# Patient Record
Sex: FEMALE | Race: WHITE | Employment: OTHER | URBAN - METROPOLITAN AREA
[De-identification: names, ages, dates, MRNs, and addresses within clinical notes are randomized per-mention and may not be internally consistent; named-entity substitution may affect disease eponyms.]

---

## 2021-05-18 ENCOUNTER — HOSPITAL ENCOUNTER (INPATIENT)
Age: 66
LOS: 4 days | Discharge: NURSING, REHAB OR LONG-TERM CARE | DRG: 522 | End: 2021-05-22
Attending: EMERGENCY MEDICINE | Admitting: INTERNAL MEDICINE
Payer: MEDICARE

## 2021-05-18 ENCOUNTER — APPOINTMENT (OUTPATIENT)
Dept: GENERAL RADIOLOGY | Age: 66
DRG: 522 | End: 2021-05-18
Attending: EMERGENCY MEDICINE
Payer: MEDICARE

## 2021-05-18 DIAGNOSIS — S72.002A CLOSED FRACTURE OF LEFT HIP, INITIAL ENCOUNTER (HCC): ICD-10-CM

## 2021-05-18 DIAGNOSIS — E87.6 HYPOKALEMIA: ICD-10-CM
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
## 2021-05-18 DIAGNOSIS — F41.9 ANXIETY: ICD-10-CM

## 2021-05-18 DIAGNOSIS — K44.9 HIATAL HERNIA: ICD-10-CM

## 2021-05-18 DIAGNOSIS — I10 HYPERTENSION, UNSPECIFIED TYPE: ICD-10-CM

## 2021-05-18 DIAGNOSIS — I35.0 SEVERE AORTIC STENOSIS: ICD-10-CM

## 2021-05-18 PROBLEM — S72.009A HIP FRACTURE (HCC): Status: ACTIVE | Noted: 2021-05-18

## 2021-05-18 LAB
ALBUMIN SERPL-MCNC: 4.2 G/DL (ref 3.5–5)
ALBUMIN/GLOB SERPL: 1.1 {RATIO} (ref 1.1–2.2)
ALP SERPL-CCNC: 98 U/L (ref 45–117)
ALT SERPL-CCNC: 25 U/L (ref 12–78)
ANION GAP SERPL CALC-SCNC: 6 MMOL/L (ref 5–15)
AST SERPL-CCNC: 18 U/L (ref 15–37)
BASOPHILS # BLD: 0 K/UL (ref 0–0.1)
BASOPHILS NFR BLD: 0 % (ref 0–1)
BILIRUB SERPL-MCNC: 0.4 MG/DL (ref 0.2–1)
BUN SERPL-MCNC: 14 MG/DL (ref 6–20)
BUN/CREAT SERPL: 18 (ref 12–20)
CALCIUM SERPL-MCNC: 8.4 MG/DL (ref 8.5–10.1)
CHLORIDE SERPL-SCNC: 105 MMOL/L (ref 97–108)
CO2 SERPL-SCNC: 27 MMOL/L (ref 21–32)
COMMENT, HOLDF: NORMAL
COVID-19 RAPID TEST, COVR: NOT DETECTED
CREAT SERPL-MCNC: 0.77 MG/DL (ref 0.55–1.02)
DIFFERENTIAL METHOD BLD: ABNORMAL
EOSINOPHIL # BLD: 0.1 K/UL (ref 0–0.4)
EOSINOPHIL NFR BLD: 1 % (ref 0–7)
ERYTHROCYTE [DISTWIDTH] IN BLOOD BY AUTOMATED COUNT: 12.9 % (ref 11.5–14.5)
GLOBULIN SER CALC-MCNC: 3.8 G/DL (ref 2–4)
GLUCOSE SERPL-MCNC: 115 MG/DL (ref 65–100)
HCT VFR BLD AUTO: 43.6 % (ref 35–47)
HGB BLD-MCNC: 14.3 G/DL (ref 11.5–16)
IMM GRANULOCYTES # BLD AUTO: 0.1 K/UL (ref 0–0.04)
IMM GRANULOCYTES NFR BLD AUTO: 1 % (ref 0–0.5)
INR PPP: 1 (ref 0.9–1.1)
LYMPHOCYTES # BLD: 0.8 K/UL (ref 0.8–3.5)
LYMPHOCYTES NFR BLD: 7 % (ref 12–49)
MAGNESIUM SERPL-MCNC: 2.1 MG/DL (ref 1.6–2.4)
MCH RBC QN AUTO: 30 PG (ref 26–34)
MCHC RBC AUTO-ENTMCNC: 32.8 G/DL (ref 30–36.5)
MCV RBC AUTO: 91.6 FL (ref 80–99)
MONOCYTES # BLD: 0.6 K/UL (ref 0–1)
MONOCYTES NFR BLD: 6 % (ref 5–13)
NEUTS SEG # BLD: 9.4 K/UL (ref 1.8–8)
NEUTS SEG NFR BLD: 85 % (ref 32–75)
NRBC # BLD: 0 K/UL (ref 0–0.01)
NRBC BLD-RTO: 0 PER 100 WBC
PLATELET # BLD AUTO: 216 K/UL (ref 150–400)
PMV BLD AUTO: 10.2 FL (ref 8.9–12.9)
POTASSIUM SERPL-SCNC: 3.2 MMOL/L (ref 3.5–5.1)
PROT SERPL-MCNC: 8 G/DL (ref 6.4–8.2)
PROTHROMBIN TIME: 10.4 SEC (ref 9–11.1)
RBC # BLD AUTO: 4.76 M/UL (ref 3.8–5.2)
SAMPLES BEING HELD,HOLD: NORMAL
SODIUM SERPL-SCNC: 138 MMOL/L (ref 136–145)
SOURCE, COVRS: NORMAL
TROPONIN I SERPL-MCNC: <0.05 NG/ML
WBC # BLD AUTO: 11 K/UL (ref 3.6–11)

## 2021-05-18 PROCEDURE — 85025 COMPLETE CBC W/AUTO DIFF WBC: CPT

## 2021-05-18 PROCEDURE — 84484 ASSAY OF TROPONIN QUANT: CPT

## 2021-05-18 PROCEDURE — 73560 X-RAY EXAM OF KNEE 1 OR 2: CPT

## 2021-05-18 PROCEDURE — 80053 COMPREHEN METABOLIC PANEL: CPT

## 2021-05-18 PROCEDURE — 36415 COLL VENOUS BLD VENIPUNCTURE: CPT

## 2021-05-18 PROCEDURE — 74011250636 HC RX REV CODE- 250/636: Performed by: INTERNAL MEDICINE

## 2021-05-18 PROCEDURE — 96375 TX/PRO/DX INJ NEW DRUG ADDON: CPT

## 2021-05-18 PROCEDURE — 72170 X-RAY EXAM OF PELVIS: CPT

## 2021-05-18 PROCEDURE — 99285 EMERGENCY DEPT VISIT HI MDM: CPT

## 2021-05-18 PROCEDURE — 71045 X-RAY EXAM CHEST 1 VIEW: CPT

## 2021-05-18 PROCEDURE — 93005 ELECTROCARDIOGRAM TRACING: CPT

## 2021-05-18 PROCEDURE — 73562 X-RAY EXAM OF KNEE 3: CPT

## 2021-05-18 PROCEDURE — 74011250636 HC RX REV CODE- 250/636: Performed by: EMERGENCY MEDICINE

## 2021-05-18 PROCEDURE — 74011250637 HC RX REV CODE- 250/637: Performed by: INTERNAL MEDICINE

## 2021-05-18 PROCEDURE — C9113 INJ PANTOPRAZOLE SODIUM, VIA: HCPCS | Performed by: INTERNAL MEDICINE

## 2021-05-18 PROCEDURE — 83735 ASSAY OF MAGNESIUM: CPT

## 2021-05-18 PROCEDURE — 73552 X-RAY EXAM OF FEMUR 2/>: CPT

## 2021-05-18 PROCEDURE — 74011250636 HC RX REV CODE- 250/636: Performed by: PHYSICIAN ASSISTANT

## 2021-05-18 PROCEDURE — 74011000250 HC RX REV CODE- 250: Performed by: EMERGENCY MEDICINE

## 2021-05-18 PROCEDURE — 87635 SARS-COV-2 COVID-19 AMP PRB: CPT

## 2021-05-18 PROCEDURE — 96374 THER/PROPH/DIAG INJ IV PUSH: CPT

## 2021-05-18 PROCEDURE — 74011250637 HC RX REV CODE- 250/637: Performed by: EMERGENCY MEDICINE

## 2021-05-18 PROCEDURE — 65660000000 HC RM CCU STEPDOWN

## 2021-05-18 PROCEDURE — 85610 PROTHROMBIN TIME: CPT

## 2021-05-18 RX ORDER — LABETALOL HCL 20 MG/4 ML
10 SYRINGE (ML) INTRAVENOUS
Status: DISCONTINUED | OUTPATIENT
Start: 2021-05-18 | End: 2021-05-18

## 2021-05-18 RX ORDER — AMLODIPINE BESYLATE 5 MG/1
5 TABLET ORAL DAILY
Status: DISCONTINUED | OUTPATIENT
Start: 2021-05-18 | End: 2021-05-22 | Stop reason: HOSPADM

## 2021-05-18 RX ORDER — SODIUM CHLORIDE 0.9 % (FLUSH) 0.9 %
5-40 SYRINGE (ML) INJECTION AS NEEDED
Status: DISCONTINUED | OUTPATIENT
Start: 2021-05-18 | End: 2021-05-19

## 2021-05-18 RX ORDER — CLOMIPRAMINE HYDROCHLORIDE 50 MG/1
50 CAPSULE ORAL
COMMUNITY

## 2021-05-18 RX ORDER — SERTRALINE HYDROCHLORIDE 100 MG/1
200 TABLET, FILM COATED ORAL
COMMUNITY

## 2021-05-18 RX ORDER — MORPHINE SULFATE 2 MG/ML
2 INJECTION, SOLUTION INTRAMUSCULAR; INTRAVENOUS
Status: DISCONTINUED | OUTPATIENT
Start: 2021-05-18 | End: 2021-05-19

## 2021-05-18 RX ORDER — ALPRAZOLAM 0.5 MG/1
0.5 TABLET ORAL
Status: ON HOLD | COMMUNITY
End: 2021-05-22 | Stop reason: SDUPTHER

## 2021-05-18 RX ORDER — METOPROLOL TARTRATE 5 MG/5ML
2.5 INJECTION INTRAVENOUS ONCE
Status: COMPLETED | OUTPATIENT
Start: 2021-05-18 | End: 2021-05-18

## 2021-05-18 RX ORDER — HYDROMORPHONE HYDROCHLORIDE 1 MG/ML
1 INJECTION, SOLUTION INTRAMUSCULAR; INTRAVENOUS; SUBCUTANEOUS ONCE
Status: COMPLETED | OUTPATIENT
Start: 2021-05-18 | End: 2021-05-18

## 2021-05-18 RX ORDER — HEPARIN SODIUM 5000 [USP'U]/ML
5000 INJECTION, SOLUTION INTRAVENOUS; SUBCUTANEOUS ONCE
Status: COMPLETED | OUTPATIENT
Start: 2021-05-18 | End: 2021-05-18

## 2021-05-18 RX ORDER — POLYETHYLENE GLYCOL 3350 17 G/17G
17 POWDER, FOR SOLUTION ORAL DAILY PRN
Status: DISCONTINUED | OUTPATIENT
Start: 2021-05-18 | End: 2021-05-22 | Stop reason: HOSPADM

## 2021-05-18 RX ORDER — SODIUM CHLORIDE 9 MG/ML
125 INJECTION, SOLUTION INTRAVENOUS ONCE
Status: COMPLETED | OUTPATIENT
Start: 2021-05-18 | End: 2021-05-19

## 2021-05-18 RX ORDER — SODIUM CHLORIDE 0.9 % (FLUSH) 0.9 %
5-40 SYRINGE (ML) INJECTION EVERY 8 HOURS
Status: DISCONTINUED | OUTPATIENT
Start: 2021-05-18 | End: 2021-05-19

## 2021-05-18 RX ORDER — POTASSIUM CHLORIDE 750 MG/1
40 TABLET, FILM COATED, EXTENDED RELEASE ORAL
Status: COMPLETED | OUTPATIENT
Start: 2021-05-18 | End: 2021-05-18

## 2021-05-18 RX ORDER — ACETAMINOPHEN 650 MG/1
650 SUPPOSITORY RECTAL
Status: DISCONTINUED | OUTPATIENT
Start: 2021-05-18 | End: 2021-05-19

## 2021-05-18 RX ORDER — ACETAMINOPHEN 325 MG/1
650 TABLET ORAL
Status: DISCONTINUED | OUTPATIENT
Start: 2021-05-18 | End: 2021-05-19

## 2021-05-18 RX ORDER — HYDRALAZINE HYDROCHLORIDE 20 MG/ML
20 INJECTION INTRAMUSCULAR; INTRAVENOUS
Status: DISCONTINUED | OUTPATIENT
Start: 2021-05-18 | End: 2021-05-22 | Stop reason: HOSPADM

## 2021-05-18 RX ORDER — LABETALOL 100 MG/1
100 TABLET, FILM COATED ORAL EVERY 12 HOURS
Status: DISCONTINUED | OUTPATIENT
Start: 2021-05-19 | End: 2021-05-20

## 2021-05-18 RX ORDER — PRAVASTATIN SODIUM 40 MG/1
40 TABLET ORAL DAILY
COMMUNITY

## 2021-05-18 RX ORDER — LEVOTHYROXINE SODIUM 200 UG/1
100 TABLET ORAL
COMMUNITY

## 2021-05-18 RX ORDER — NALOXONE HYDROCHLORIDE 0.4 MG/ML
0.4 INJECTION, SOLUTION INTRAMUSCULAR; INTRAVENOUS; SUBCUTANEOUS
Status: DISCONTINUED | OUTPATIENT
Start: 2021-05-18 | End: 2021-05-22 | Stop reason: HOSPADM

## 2021-05-18 RX ADMIN — MORPHINE SULFATE 2 MG: 2 INJECTION, SOLUTION INTRAMUSCULAR; INTRAVENOUS at 20:07

## 2021-05-18 RX ADMIN — Medication 10 ML: at 16:12

## 2021-05-18 RX ADMIN — LABETALOL HYDROCHLORIDE 10 MG: 5 INJECTION, SOLUTION INTRAVENOUS at 22:01

## 2021-05-18 RX ADMIN — Medication 10 ML: at 22:05

## 2021-05-18 RX ADMIN — PANTOPRAZOLE SODIUM 40 MG: 40 INJECTION, POWDER, FOR SOLUTION INTRAVENOUS at 20:07

## 2021-05-18 RX ADMIN — HYDROMORPHONE HYDROCHLORIDE 1 MG: 1 INJECTION, SOLUTION INTRAMUSCULAR; INTRAVENOUS; SUBCUTANEOUS at 14:09

## 2021-05-18 RX ADMIN — METOPROLOL TARTRATE 2.5 MG: 5 INJECTION INTRAVENOUS at 15:42

## 2021-05-18 RX ADMIN — AMLODIPINE BESYLATE 5 MG: 5 TABLET ORAL at 19:18

## 2021-05-18 RX ADMIN — LABETALOL HYDROCHLORIDE 100 MG: 100 TABLET, FILM COATED ORAL at 23:19

## 2021-05-18 RX ADMIN — HEPARIN SODIUM 5000 UNITS: 5000 INJECTION INTRAVENOUS; SUBCUTANEOUS at 18:09

## 2021-05-18 RX ADMIN — POTASSIUM CHLORIDE 40 MEQ: 750 TABLET, FILM COATED, EXTENDED RELEASE ORAL at 15:42

## 2021-05-18 RX ADMIN — MORPHINE SULFATE 2 MG: 2 INJECTION, SOLUTION INTRAMUSCULAR; INTRAVENOUS at 23:19

## 2021-05-18 RX ADMIN — ACETAMINOPHEN 650 MG: 325 TABLET ORAL at 22:00

## 2021-05-18 RX ADMIN — LABETALOL HYDROCHLORIDE 10 MG: 5 INJECTION, SOLUTION INTRAVENOUS at 18:10

## 2021-05-18 RX ADMIN — MORPHINE SULFATE 2 MG: 2 INJECTION, SOLUTION INTRAMUSCULAR; INTRAVENOUS at 16:41

## 2021-05-18 RX ADMIN — SODIUM CHLORIDE 125 ML/HR: 9 INJECTION, SOLUTION INTRAVENOUS at 14:24

## 2021-05-18 NOTE — PROGRESS NOTES
CARE MANAGEMENT INITIAL ASSESSEMENT      NAME:   Bala Marie   :     1955   MRN:     [id number]       Emergency Contact:  Extended Emergency Contact Information  Primary Emergency Contact: [address]  Mobile Phone: [phone]  Relation: Other Relative    Advance Directive:  No Order, Does not have advance directive. Healthcare Decision Maker:   Charles Saturnino- brother- Pt is unable to provide a phone number at this time. Pt does not believe the number on the chart is correct. Pt states that she does not have a cell phone and doesn't have his number written down anywhere. Pt states that he will be \"in and out\" so she can get his number then. Reason for Admission:  Ms. Tee Patel is a 78 y.o. female with history that includes HTN and hypokalemia. Pt was seen in the ER for fall- hip pain. There is no problem list on file for this patient. Assessment: In person with patient    RUR:  Not available  Risk Level:  Low  Value-based purchasing:  no  Bundle patient:  No    Residency: Private residence  Exterior Steps:  None  Interior Steps:  14- bedroom upstairs.  No usual problems ambulating up/down steps    Lives With: Alone    Prior functioning:  Independent  Pt requires assistance with: N/A    Prior DME required:  [x]None  []RW  []Cane  []Crutches  []Bedside commode  []CPAP  []Home O2 (Liters/Provider: )  []Nebulizer   []Shower Chair  []Wheelchair  []Hospital Bed  []Darlene  []Stair lift  []Rollator  []Other:    DME available:  [x]None  []RW  []Cane  []Crutches  []Bedside commode  []CPAP  []Home O2 (Liter/Provider: )  []Nebulizer  []Shower Chair  []Wheelchair  []Hospital Bed  []Darlene  []Stair lift  []Rollator  []Other:    Rehab history:  None     Discharge Concerns:  No    Insurer:  Payor: Ben Solum / Plan: [place] MEDICARE PART A & B / Product Type: Medicare /     PCP: Idalia Cervantes MD   Name of Practice: [facility]   Current patient: yes   Approximate date of last visit: 2019. Pt states that she had appt scheduled in 2020 but it was cancelled because of COVID and she never rescheduled it. Access to virtual PCP visits: no    Pharmacy:  [facility] Transport:  Family      Transition of care plan:   Unable to determine at this time. Awaiting clinical progress, and disposition recommendations. Comments:   CM completed initial assessment with Pt. Pt states that she lives at home alone. Pt has no hx of HH, home O2, or equipment. Pt is independent with ADLs and ambulation. Pt denies problems with either. CM will continue to monitor for discharge needs. _____________________________________  EVANGELIST Oconnell - Care Management  5/18/2021   3:49 PM    Care Management Interventions  PCP Verified by CM: Dony Hodge MD)  Mode of Transport at Discharge:  Other (see comment)(family)  Transition of Care Consult (CM Consult): Discharge Planning  MyChart Signup: No  Discharge Durable Medical Equipment: No  Physical Therapy Consult: No  Occupational Therapy Consult: No  Speech Therapy Consult: No  Current Support Network: Own Home, Lives Alone  Confirm Follow Up Transport: Family  Discharge Location  Discharge Placement: Home with outpatient services

## 2021-05-18 NOTE — ED NOTES
Patient is diaphoretic after ain medication. Patient denies chest pain, DR. Diamond at the bedside. Patient placed on 2L of O2 due to hypoxia after pain medication.

## 2021-05-18 NOTE — ED TRIAGE NOTES
Patient arrived via EMS. Reports fall , no LOC, no dizziness. Reports pain to the left hip. Left leg is rotated to the left, pulse and sensation present.

## 2021-05-18 NOTE — H&P
[address]  [address] [facility], [address]  [phone]    Hospitalist Admission Note      NAME:  Sole Leavitt   :      MRN:  [id number]     PCP:  Nyla Smith MD     Date of Service/Time:  2021 6:55 PM         Assessment / Plan:       78 y.o. female with hx of HTN, HLD, hiatal hernia admitted for L hip fracture after GLF      Left hip fracture: traumatic. Pain control on IV opioid analgesics PRN. Given a dose of heparin SQ in ED. Will need surgery per ortho; pre-op assessment ongoing as below. Pre-op assessment: uncontrolled HTN perhaps from inadequate pain control however her BP is markedly elevated. Takes no antihypertensives at home and states she has \"white coat syndrome\". AS on exam, and has CLEMENS. Check echo. Cardiology evaluation      HTN: markedly elevated BP. Control pain. Start Norvasc if pain remains uncontrolled despite pain control. IV labetalol PRN. Hernia, hiatal: Large hiatal hernia likely containing most of the stomach with associated left lower lobe atelectasis seen on CXR. Start PPI       Hypothyroidism: check TSH. Med rec to confirm use of Synthroid      HLD (hyperlipidemia): check FLP. Med rec to confirm use of statin      Code Status: FULL     Surrogate decision maker: family      ED notes, lab results, and imaging studies reviewed.        Total time spent with patient: 50 Minutes   Time spent in the care of this patient included reviewing records, discussing with nursing, obtaining history and examining the patient, and discussing treatment plans, with >50% time spent counseling/coordinating care    Risk of deterioration: High                 Care Plan discussed with: ED provider, Patient, Nursing Staff and >50% of time spent in counseling and coordination of care    Discussed:  Care Plan and D/C Planning    Prophylaxis:  Hep SQ    Disposition:  SNF/LTC                 Subjective:     CHIEF COMPLAINT: hip pain HISTORY OF PRESENT ILLNESS:     Ms. Jesus Palacios is a 78 y.o. female w/ hx of  HTN, HLD, hiatal hernia presenting with L hip pain. Started immediately after a GLF today. Thought that she tripped over her feet. Did not have any CP or SOB or palpitations prior to fall. Pain is severe, sharp, worse w/ movement, better after IV pain meds. She reports she walks one mile 3 times per week. However, she has a hard time keeping up with people with whom she walks, who are over [de-identified]years old. She finds herself short-of-breath, having to stop to catch her breath. Denies recent CP, dizziness, palpitations. States she has \"white coat syndrome\" causing her elevated BP. ED workup showed acute left femur neck fracture with varus angulation and foreshortening. Ms. Jesus Palacios is admitted for further evaluation and management. Past Medical History:   Diagnosis Date    Hernia, hiatal     Hypothyroidism     OCD (obsessive compulsive disorder)         History reviewed. No pertinent surgical history.     Social History     Tobacco Use    Smoking status: Never Smoker    Smokeless tobacco: Never Used   Substance Use Topics    Alcohol use: Not Currently        Family History: family history of HTN    No Known Allergies     Prior to Admission medications    Not on File       Review of Systems:  (bold if positive, if negative)    Gen:  Eyes:  ENT:  CVS:  Pulm:  dyspneaGI:  :  MS:  Pain, weakness, swelling, Skin:  Psych:  Endo:  Hem:  Renal:  Neuro:            Objective:      VITALS:    Vital signs reviewed; most recent are:    Visit Vitals  BP (!) 175/98   Pulse (!) 108   Temp 98.7 °F (37.1 °C)   Resp 16   Ht 5' 8\" (1.727 m)   Wt 72.6 kg (160 lb)   SpO2 95%   BMI 24.33 kg/m²     SpO2 Readings from Last 6 Encounters:   05/18/21 95%    O2 Flow Rate (L/min): 2 l/min   No intake or output data in the 24 hours ending 05/18/21 9484         Exam:     Physical Exam:    Gen:  Well-developed, well-nourished, in no acute distress  HEENT: No scleral icterus, hearing intact to voice, moist mucous membranes  Neck:  Supple, without masses. Resp:  No accessory muscle use. CTAB without wheezing, rales, rhonchi  Card: RRR. Normal S1 and S2 w/ 3/6 systolic murmur RUSB. No rubs or gallops. No peripheral lower extremity edema. No JVD. Peripheral pulses in tact. Abd:  Normoactive bowel sounds. Soft, non-tender, non-distended. No rebound, no guarding. No appreciable hepatosplenomegaly   Lymph:  No cervical adenopathy  Musc:  No cyanosis or clubbing  Skin:  No rashes or ulcers; turgor intact. Neuro:  Cranial nerves are grossly intact, no focal motor weakness, follows commands appropriately  Psych:  Good insight, normal affect. Alert, oriented x 3. Answers questions appropriately       Labs:    Recent Labs     05/18/21  1428   WBC 11.0   HGB 14.3   HCT 43.6        Recent Labs     05/18/21  1428      K 3.2*      CO2 27   *   BUN 14   CREA 0.77   CA 8.4*   MG 2.1   ALB 4.2   ALT 25     No components found for: GLPOC  No results for input(s): PH, PCO2, PO2, HCO3, FIO2 in the last 72 hours. Recent Labs     05/18/21  1428   INR 1.0     No results found for: SDES  No results found for: CULT  All other current labs reviewed in the computer. Imaging/Studies:    Xr Chest Sngl V    Result Date: 5/18/2021  Large hiatal hernia likely containing most of the stomach with associated left lower lobe atelectasis that could obscure other infiltrate. Xr Pelv Ap Only    Result Date: 5/18/2021  Acute left femur neck fracture with varus angulation and foreshortening. Bones are osteopenic which could obscure a nondisplaced fracture and if there is high clinical suspicion for the fracture site, CT can be considered. Xr Femur Lt 2 V    Result Date: 5/18/2021  Acute left femur neck fracture with varus angulation and foreshortening. Xr Knee Lt Max 2 Vws    Result Date: 5/18/2021  No acute findings. Advanced osteoarthritis. Osteopenia. Imaging personally reviewed.     EKG: sinus tachy, no S-ST changes  EKG personally reviewed    ___________________________________________________    Attending Physician: Aneta Dempsey MD

## 2021-05-18 NOTE — CONSULTS
ORTHO CONSULT NOTE    Date of Consultation:  May 18, 2021  Referring Physician:  Leeanne Escalante  CC: left hip pain    HPI:  Kika Hylton is a 78 y.o. female PMH thyroid disease, psoriasis, OCD, and previous hand surgeries Dr. Ruslan Flor  who c/o left hip pain s/p GLF. She states she simply tripped and fell onto her left side in her dining room at home today resulting in left hip pain and inability to ambulate. She denies open hip wound, foot numbness, and pain in other extremities. She denies chest pain, shortness of breath, and dizziness at the time of her fall. At baseline, she lives alone in a townhouse and ambulates without a device. She states she walks 1-1.5 miles for exercise several times weekly. She denies hip pain prior to her fall. Past Medical History:   Diagnosis Date    Hernia, hiatal     Hypothyroidism     OCD (obsessive compulsive disorder)          Social History     Tobacco Use    Smoking status: Never Smoker    Smokeless tobacco: Never Used   Substance Use Topics    Alcohol use: Not Currently     No Known Allergies     Review of Systems:  Per HPI. Objective:     Patient Vitals for the past 8 hrs:   BP Temp Pulse Resp SpO2 Height Weight   21 1545 (!) 176/102    91 %     21 1542 (!) 237/202  (!) 105       21 1445 (!) 198/114    94 %     21 1430 (!) 202/121    94 %     21 1427     94 %     21 1415 (!) 201/116    92 %     21 1400     93 %     21 1345 (!) 192/109    92 %     21 1325     98 %     21 1320 (!) 207/125 98.5 °F (36.9 °C) (!) 102 18 98 % 5' 8\" (1.727 m) 72.6 kg (160 lb)     Temp (24hrs), Av.5 °F (36.9 °C), Min:98.5 °F (36.9 °C), Max:98.5 °F (36.9 °C)      EXAM:   NAD. Answers questions appropriately. Cervical spine NTTP. Moves BUE spontaneously with NTTP long bones and joints. RLE no pain PROM, NTTP long bones and joints. Moves foot OK with SILT and palp DP.   LLE shortened and externally rotated. Hip skin intact and soft compartments. Knee, ankle and foot NTTP. Moves foot OK with SILT and palp DP. Bilat calf soft and NTTP. Imaging Review:   AP pelvis 5/18:  Acute left femur neck fracture with varus angulation and foreshortening. Bones are osteopenic which could obscure a nondisplaced fracture and if there is high clinical suspicion for the fracture site, CT can be considered. Labs:   WBC 11.0, Hgb 14.3, . BUN 14, Cr 0.77, eGFR > 60. Impression:     Patient Active Problem List    Diagnosis Date Noted    Hip fracture ([facility] [address] 05/18/2021    Hypothyroidism     Hernia, hiatal     HTN (hypertension)     HLD (hyperlipidemia)      Active Problems:    Hypothyroidism ()      Hernia, hiatal ()      HTN (hypertension) ()      HLD (hyperlipidemia) ()      Hip fracture ([facility] [address] (5/18/2021)      Displaced left femoral neck fracture. Plan:   I explained the nature of the injury and discussed the recommended surgery. I discussed potential risks/benefits/alternatives of surgery and the patient consents. Plan for left hip arthroplasty - total vs lorna. Medical evaluation/clearance pending. Bedrest.  NPO until timing of surgery determined. Ice. SCDs OK. Consider TXA IV and Heparin SQ single dose IN ER if surgery not within 8 hours. Dr. Talha Lopez is aware and agrees with above plan. Addendum 17:30:  After further activity discussion and imaging review, decision for left hip lorna-arthroplasty tomorrow afternoon ~15:30. I posted with OR. OK single dose Heparin now.   I'll hold off on TXA in ER with pending cardiac eval.      CARLIE Fontanez  Orthopedic Trauma Service  [facility] [place]

## 2021-05-18 NOTE — ED PROVIDER NOTES
Patient is a 65-year-old female with past medical history significant for, hyperlipidemia and hypothyroidism, as well as a fracture of her pelvis in the past related to fall and seen by [address] [place] [address]. She presents emergency department via EMS for evaluation of left hip pain that she sustained during a fall today. She states she is not sure what happened but denies any preceding symptoms and denies any chest pain or shortness of breath. Patient notes pain in her left hip. No prior surgeries to her hips or pelvis. She denies hitting her head and has no neck or back pain. Patient states she does take medications but did not bring them with her because she was told by the rescue squad that they had written them all down and she did need to bring in her list.  She denies taking anything for hypertension but states that she has history of whitecoat syndrome. She also denies having any history of lung disease or heart disease to her knowledge. She states of note however she has had some slight exercise intolerance and feels somewhat short of breath when she is walking with her friends. No past medical history on file. No past surgical history on file. No family history on file.     Social History     Socioeconomic History    Marital status: SINGLE     Spouse name: Not on file    Number of children: Not on file    Years of education: Not on file    Highest education level: Not on file   Occupational History    Not on file   Social Needs    Financial resource strain: Not on file    Food insecurity     Worry: Not on file     Inability: Not on file    Transportation needs     Medical: Not on file     Non-medical: Not on file   Tobacco Use    Smoking status: Not on file   Substance and Sexual Activity    Alcohol use: Not on file    Drug use: Not on file    Sexual activity: Not on file   Lifestyle    Physical activity     Days per week: Not on file     Minutes per session: Not on file    Stress: Not on file   Relationships    Social connections     Talks on phone: Not on file     Gets together: Not on file     Attends Latter day service: Not on file     Active member of club or organization: Not on file     Attends meetings of clubs or organizations: Not on file     Relationship status: Not on file    Intimate partner violence     Fear of current or ex partner: Not on file     Emotionally abused: Not on file     Physically abused: Not on file     Forced sexual activity: Not on file   Other Topics Concern    Not on file   Social History Narrative    Not on file         ALLERGIES: Patient has no known allergies. Review of Systems   Constitutional: Negative for chills and fever. HENT: Negative for drooling. Respiratory: Negative for shortness of breath. Cardiovascular: Negative for chest pain. Gastrointestinal: Negative for abdominal pain. Musculoskeletal: Positive for arthralgias. Negative for back pain, neck pain and neck stiffness. Skin: Negative for rash. Neurological: Negative for tremors, seizures, syncope, weakness, numbness and headaches. Hematological: Does not bruise/bleed easily. Psychiatric/Behavioral: Negative. Vitals:    05/18/21 1320 05/18/21 1325   BP: (!) 207/125    Pulse: (!) 102    Resp: 18    Temp: 98.5 °F (36.9 °C)    SpO2: 98% 98%   Weight: 72.6 kg (160 lb)    Height: 5' 8\" (1.727 m)             Physical Exam  Vitals signs and nursing note reviewed. Constitutional:       General: She is in acute distress. Appearance: She is not toxic-appearing or diaphoretic. HENT:      Head: Normocephalic and atraumatic. Right Ear: External ear normal.      Left Ear: External ear normal.   Eyes:      General: No scleral icterus. Neck:      Musculoskeletal: Neck supple. No muscular tenderness. Trachea: Trachea and phonation normal.   Cardiovascular:      Rate and Rhythm: Tachycardia present. Pulses: Normal pulses. Heart sounds:  No friction rub. Pulmonary:      Effort: Pulmonary effort is normal.      Breath sounds: Normal breath sounds. Abdominal:      Palpations: Abdomen is soft. Tenderness: There is no abdominal tenderness. There is no guarding. Musculoskeletal:      Comments: Left leg externally rotated and foreshortened. Skin:     General: Skin is warm and dry. Neurological:      Mental Status: She is alert and oriented to person, place, and time. Sensory: No sensory deficit. Psychiatric:         Mood and Affect: Mood normal.         Behavior: Behavior normal.         Thought Content: Thought content normal.         Judgment: Judgment normal.          MDM  Number of Diagnoses or Management Options     Amount and/or Complexity of Data Reviewed  Clinical lab tests: reviewed and ordered  Tests in the radiology section of CPT®: reviewed and ordered  Discuss the patient with other providers: yes  Independent visualization of images, tracings, or specimens: yes    Risk of Complications, Morbidity, and/or Mortality  Presenting problems: high  Diagnostic procedures: high  Management options: high    Patient Progress  Patient progress: improved    ED Course as of May 18 1513   Tue May 18, 2021   1432 EKG obtained at 1419 showing sinus tachycardia, rate 102, normal intervals, normal axis, no acute appearing findings, no prior EKG available for comparison.     [JE]      ED Course User Index  [JE] Isa Dowell MD       Critical Care  Performed by: Isa Dowell MD  Authorized by: Isa Dowell MD     Critical care provider statement:     Critical care time (minutes):  45    Critical care time was exclusive of:  Separately billable procedures and treating other patients    Critical care was necessary to treat or prevent imminent or life-threatening deterioration of the following conditions:  Trauma    Critical care was time spent personally by me on the following activities:  Ordering and performing treatments and interventions, ordering and review of laboratory studies, ordering and review of radiographic studies, re-evaluation of patient's condition, pulse oximetry, review of old charts, development of treatment plan with patient or surrogate, discussions with consultants, evaluation of patient's response to treatment, examination of patient and obtaining history from patient or surrogate    I assumed direction of critical care for this patient from another provider in my specialty: no            Perfect Serve Consult for Admission  3:47 PM    ED Room Number: ER10/10  Patient Name and age:  Aaron Shine 78 y.o.  female  Working Diagnosis:   1. Fall, initial encounter    2. Hypertension, unspecified type    3. Closed fracture of left hip, initial encounter ([facility] [address]    4. Hypokalemia    5. Hiatal hernia        COVID-19 Suspicion:  no  Sepsis present:  no  Reassessment needed: no  Code Status:  Full Code  Readmission: no  Isolation Requirements:  no  Recommended Level of Care:  telemetry  Department:[facility] ED - [phone]  Other: Patient is a 62-year-old female with past medical history significant for hypothyroidism, OCD and hyperlipidemia who presents emergency department after mechanical fall. She states she had no preceding symptoms but has a left angulated femoral neck fracture. She has been seen by Peter Mcelroy in the past and is being seen by them again today in ER. Plan is for OR. Patient denies cardiac history or history of hypertension however patient has been significantly hypertensive while in the ER. She states that she does have a history of whitecoat syndrome but obviously is in pain pain also. Despite significant improvement in her pain with Dilaudid she is still quite hypertensive and so a dose of Lopressor has been ordered. Patient also notes that she typically walks every day with friends but has not had some dyspnea on exertion lately. Denies any recent chest pains. Troponin is negative.

## 2021-05-18 NOTE — ED NOTES
TRANSFER - OUT REPORT:    Verbal report given to Rn PCC(name) on [facility]  being transferred to 3rd floor(unit) for routine progression of care       Report consisted of patients Situation, Background, Assessment and   Recommendations(SBAR). Information from the following report(s) SBAR, ED Summary, Intake/Output, MAR, Recent Results and Cardiac Rhythm Sinus tach was reviewed with the receiving nurse. Lines:   Peripheral IV 05/18/21 Left Antecubital (Active)   Site Assessment Clean, dry, & intact 05/18/21 1324   Phlebitis Assessment 0 05/18/21 1324   Infiltration Assessment 0 05/18/21 1324   Dressing Status Clean, dry, & intact 05/18/21 1324   Dressing Type Transparent 05/18/21 1324   Hub Color/Line Status Flushed 05/18/21 1324        Opportunity for questions and clarification was provided.       Patient transported with:   Monitor  O2 @ 2 liters  Registered Nurse

## 2021-05-18 NOTE — PROGRESS NOTES
TRANSFER - IN REPORT:    Verbal report received from [facility] on Supriya Agarwal  being received from ED for routine progression of care      Report consisted of patients Situation, Background, Assessment and   Recommendations(SBAR). Information from the following report(s) SBAR, Kardex and ED Summary was reviewed with the receiving nurse. Opportunity for questions and clarification was provided. Assessment completed upon patients arrival to unit and care assumed. 1909: /123, MD notified. Give norvasc now and recheck in 1 hr per MD .     Shift Change:      Bedside and Verbal shift change report given to Jai Reed (oncoming nurse) by Conner English (offgoing nurse). Report included the following information SBAR, Kardex and Recent Results.

## 2021-05-18 NOTE — CONSULTS
Full note to follow. 79 yo F w/ hx of ? HTN, hypothyroidism, HLD presenting with L hip fx s/p mechanical GLF. She has uncontrolled HTN perhaps from inadequate pain control however her BP is markedly elevated. Pt states she takes no antihypertensives at home and has \"white coat syndrome\". She further tells me while she walks a mile three times per week, she has a hard time keeping up with >[de-identified]year olds and gets winded, having to stop to catch her breath. On exam, she has a loud systolic murmur RUSB suspicious for AS. Preoperatively, pt will indeed need an echo. We will also ask cardiology to opine on preoperative assessment.      Discussed with ortho

## 2021-05-19 ENCOUNTER — ANESTHESIA (OUTPATIENT)
Dept: SURGERY | Age: 66
DRG: 522 | End: 2021-05-19
Payer: MEDICARE

## 2021-05-19 ENCOUNTER — APPOINTMENT (OUTPATIENT)
Dept: GENERAL RADIOLOGY | Age: 66
DRG: 522 | End: 2021-05-19
Attending: ANESTHESIOLOGY
Payer: MEDICARE

## 2021-05-19 ENCOUNTER — APPOINTMENT (OUTPATIENT)
Dept: NON INVASIVE DIAGNOSTICS | Age: 66
DRG: 522 | End: 2021-05-19
Attending: INTERNAL MEDICINE
Payer: MEDICARE

## 2021-05-19 ENCOUNTER — ANESTHESIA EVENT (OUTPATIENT)
Dept: SURGERY | Age: 66
DRG: 522 | End: 2021-05-19
Payer: MEDICARE

## 2021-05-19 ENCOUNTER — APPOINTMENT (OUTPATIENT)
Dept: GENERAL RADIOLOGY | Age: 66
DRG: 522 | End: 2021-05-19
Attending: PHYSICIAN ASSISTANT
Payer: MEDICARE

## 2021-05-19 LAB
ALBUMIN SERPL-MCNC: 3.6 G/DL (ref 3.5–5)
ALBUMIN/GLOB SERPL: 1 {RATIO} (ref 1.1–2.2)
ALP SERPL-CCNC: 80 U/L (ref 45–117)
ALT SERPL-CCNC: 22 U/L (ref 12–78)
ANION GAP SERPL CALC-SCNC: 5 MMOL/L (ref 5–15)
AST SERPL-CCNC: 23 U/L (ref 15–37)
ATRIAL RATE: 102 BPM
BASOPHILS # BLD: 0 K/UL (ref 0–0.1)
BASOPHILS NFR BLD: 0 % (ref 0–1)
BILIRUB SERPL-MCNC: 0.8 MG/DL (ref 0.2–1)
BUN SERPL-MCNC: 14 MG/DL (ref 6–20)
BUN/CREAT SERPL: 19 (ref 12–20)
CALCIUM SERPL-MCNC: 8.2 MG/DL (ref 8.5–10.1)
CALCULATED P AXIS, ECG09: 58 DEGREES
CALCULATED R AXIS, ECG10: 18 DEGREES
CALCULATED T AXIS, ECG11: 58 DEGREES
CHLORIDE SERPL-SCNC: 105 MMOL/L (ref 97–108)
CHOLEST SERPL-MCNC: 184 MG/DL
CO2 SERPL-SCNC: 28 MMOL/L (ref 21–32)
CREAT SERPL-MCNC: 0.72 MG/DL (ref 0.55–1.02)
DIAGNOSIS, 93000: NORMAL
DIFFERENTIAL METHOD BLD: ABNORMAL
ECHO AO ROOT DIAM: 3.73 CM
ECHO AV AREA PEAK VELOCITY: 0.74 CM2
ECHO AV AREA PEAK VELOCITY: 0.75 CM2
ECHO AV AREA VTI: 0.72 CM2
ECHO AV AREA/BSA VTI: 0.4 CM2/M2
ECHO AV MEAN GRADIENT: 40.69 MMHG
ECHO AV PEAK GRADIENT: 67.04 MMHG
ECHO AV PEAK VELOCITY: 409.39 CM/S
ECHO AV VTI: 81.63 CM
ECHO LA AREA 4C: 15.9 CM2
ECHO LA MAJOR AXIS: 2.91 CM
ECHO LA MINOR AXIS: 1.56 CM
ECHO LA VOL 2C: 43.91 ML (ref 22–52)
ECHO LA VOL 4C: 37.96 ML (ref 22–52)
ECHO LA VOL BP: 43.39 ML (ref 22–52)
ECHO LA VOL/BSA BIPLANE: 23.33 ML/M2 (ref 16–28)
ECHO LA VOLUME INDEX A2C: 23.61 ML/M2 (ref 16–28)
ECHO LA VOLUME INDEX A4C: 20.41 ML/M2 (ref 16–28)
ECHO LV E' LATERAL VELOCITY: 12.96 CENTIMETER/SECOND
ECHO LV E' SEPTAL VELOCITY: 9.24 CENTIMETER/SECOND
ECHO LV EDV A2C: 60.22 ML
ECHO LV EDV A4C: 81 ML
ECHO LV EDV BP: 72.76 ML (ref 56–104)
ECHO LV EDV INDEX A4C: 43.5 ML/M2
ECHO LV EDV INDEX BP: 39.1 ML/M2
ECHO LV EDV NDEX A2C: 32.4 ML/M2
ECHO LV EJECTION FRACTION A2C: 41 PERCENT
ECHO LV EJECTION FRACTION A4C: 54 PERCENT
ECHO LV EJECTION FRACTION BIPLANE: 50.2 PERCENT (ref 55–100)
ECHO LV ESV A2C: 35.35 ML
ECHO LV ESV A4C: 36.91 ML
ECHO LV ESV BP: 36.24 ML (ref 19–49)
ECHO LV ESV INDEX A2C: 19 ML/M2
ECHO LV ESV INDEX A4C: 19.8 ML/M2
ECHO LV ESV INDEX BP: 19.5 ML/M2
ECHO LV INTERNAL DIMENSION DIASTOLIC: 5.27 CM (ref 3.9–5.3)
ECHO LV INTERNAL DIMENSION SYSTOLIC: 3.74 CM
ECHO LV IVSD: 0.86 CM (ref 0.6–0.9)
ECHO LV MASS 2D: 169.1 G (ref 67–162)
ECHO LV MASS INDEX 2D: 90.9 G/M2 (ref 43–95)
ECHO LV POSTERIOR WALL DIASTOLIC: 0.91 CM (ref 0.6–0.9)
ECHO LVOT DIAM: 2.16 CM
ECHO LVOT PEAK GRADIENT: 2.74 MMHG
ECHO LVOT PEAK GRADIENT: 2.9 MMHG
ECHO LVOT PEAK VELOCITY: 82.73 CM/S
ECHO LVOT PEAK VELOCITY: 83.64 CM/S
ECHO LVOT SV: 59.1 ML
ECHO LVOT VTI: 16.1 CM
ECHO MV A VELOCITY: 39.93 CENTIMETER/SECOND
ECHO MV AREA PHT: 7.11 CM2
ECHO MV E DECELERATION TIME (DT): 106.66 MS
ECHO MV E VELOCITY: 107.52 CENTIMETER/SECOND
ECHO MV PRESSURE HALF TIME (PHT): 30.93 MS
ECHO RV INTERNAL DIMENSION: 2.05 CM
ECHO RV TAPSE: 2.23 CM (ref 1.5–2)
ECHO TV REGURGITANT MAX VELOCITY: 326.02 CM/S
ECHO TV REGURGITANT PEAK GRADIENT: 42.52 MMHG
EOSINOPHIL # BLD: 0 K/UL (ref 0–0.4)
EOSINOPHIL NFR BLD: 0 % (ref 0–7)
ERYTHROCYTE [DISTWIDTH] IN BLOOD BY AUTOMATED COUNT: 13.2 % (ref 11.5–14.5)
GLOBULIN SER CALC-MCNC: 3.7 G/DL (ref 2–4)
GLUCOSE BLD STRIP.AUTO-MCNC: 158 MG/DL (ref 65–117)
GLUCOSE SERPL-MCNC: 122 MG/DL (ref 65–100)
HCT VFR BLD AUTO: 40 % (ref 35–47)
HDLC SERPL-MCNC: 67 MG/DL
HDLC SERPL: 2.7 {RATIO} (ref 0–5)
HGB BLD-MCNC: 13.3 G/DL (ref 11.5–16)
IMM GRANULOCYTES # BLD AUTO: 0 K/UL (ref 0–0.04)
IMM GRANULOCYTES NFR BLD AUTO: 0 % (ref 0–0.5)
LA VOL DISK BP: 40.92 ML (ref 22–52)
LDLC SERPL CALC-MCNC: 103.8 MG/DL (ref 0–100)
LVOT MG: 1.34 MMHG
LYMPHOCYTES # BLD: 0.8 K/UL (ref 0.8–3.5)
LYMPHOCYTES NFR BLD: 6 % (ref 12–49)
MAGNESIUM SERPL-MCNC: 2.1 MG/DL (ref 1.6–2.4)
MCH RBC QN AUTO: 30.4 PG (ref 26–34)
MCHC RBC AUTO-ENTMCNC: 33.3 G/DL (ref 30–36.5)
MCV RBC AUTO: 91.3 FL (ref 80–99)
MONOCYTES # BLD: 1 K/UL (ref 0–1)
MONOCYTES NFR BLD: 8 % (ref 5–13)
NEUTS SEG # BLD: 10.9 K/UL (ref 1.8–8)
NEUTS SEG NFR BLD: 86 % (ref 32–75)
NRBC # BLD: 0 K/UL (ref 0–0.01)
NRBC BLD-RTO: 0 PER 100 WBC
P-R INTERVAL, ECG05: 158 MS
PHOSPHATE SERPL-MCNC: 2.7 MG/DL (ref 2.6–4.7)
PLATELET # BLD AUTO: 201 K/UL (ref 150–400)
PMV BLD AUTO: 10.6 FL (ref 8.9–12.9)
POTASSIUM SERPL-SCNC: 3.4 MMOL/L (ref 3.5–5.1)
PROT SERPL-MCNC: 7.3 G/DL (ref 6.4–8.2)
Q-T INTERVAL, ECG07: 396 MS
QRS DURATION, ECG06: 98 MS
QTC CALCULATION (BEZET), ECG08: 516 MS
RBC # BLD AUTO: 4.38 M/UL (ref 3.8–5.2)
RBC MORPH BLD: ABNORMAL
SERVICE CMNT-IMP: ABNORMAL
SODIUM SERPL-SCNC: 138 MMOL/L (ref 136–145)
TRIGL SERPL-MCNC: 66 MG/DL (ref ?–150)
TSH SERPL DL<=0.05 MIU/L-ACNC: 7.01 UIU/ML (ref 0.36–3.74)
VENTRICULAR RATE, ECG03: 102 BPM
VLDLC SERPL CALC-MCNC: 13.2 MG/DL
WBC # BLD AUTO: 12.7 K/UL (ref 3.6–11)

## 2021-05-19 PROCEDURE — 74011250636 HC RX REV CODE- 250/636: Performed by: INTERNAL MEDICINE

## 2021-05-19 PROCEDURE — 77030002933 HC SUT MCRYL J&J -A: Performed by: ORTHOPAEDIC SURGERY

## 2021-05-19 PROCEDURE — 82962 GLUCOSE BLOOD TEST: CPT

## 2021-05-19 PROCEDURE — 74011250637 HC RX REV CODE- 250/637: Performed by: INTERNAL MEDICINE

## 2021-05-19 PROCEDURE — 65660000000 HC RM CCU STEPDOWN

## 2021-05-19 PROCEDURE — 77030010507 HC ADH SKN DERMBND J&J -B: Performed by: ORTHOPAEDIC SURGERY

## 2021-05-19 PROCEDURE — 77030018074 HC RTVR SUT ARTH4 S&N -B: Performed by: ORTHOPAEDIC SURGERY

## 2021-05-19 PROCEDURE — 80061 LIPID PANEL: CPT

## 2021-05-19 PROCEDURE — C1776 JOINT DEVICE (IMPLANTABLE): HCPCS | Performed by: ORTHOPAEDIC SURGERY

## 2021-05-19 PROCEDURE — 72170 X-RAY EXAM OF PELVIS: CPT

## 2021-05-19 PROCEDURE — 77030008684 HC TU ET CUF COVD -B: Performed by: ANESTHESIOLOGY

## 2021-05-19 PROCEDURE — 74011250636 HC RX REV CODE- 250/636: Performed by: PHYSICIAN ASSISTANT

## 2021-05-19 PROCEDURE — 77030008462 HC STPLR SKN PROX J&J -A: Performed by: ORTHOPAEDIC SURGERY

## 2021-05-19 PROCEDURE — 77030040922 HC BLNKT HYPOTHRM STRY -A

## 2021-05-19 PROCEDURE — 76010000153 HC OR TIME 1.5 TO 2 HR: Performed by: ORTHOPAEDIC SURGERY

## 2021-05-19 PROCEDURE — 77030006835 HC BLD SAW SAG STRY -B: Performed by: ORTHOPAEDIC SURGERY

## 2021-05-19 PROCEDURE — 77030040241 HC ABD PLLW HIP MDII -B: Performed by: ORTHOPAEDIC SURGERY

## 2021-05-19 PROCEDURE — 74011250636 HC RX REV CODE- 250/636

## 2021-05-19 PROCEDURE — C9113 INJ PANTOPRAZOLE SODIUM, VIA: HCPCS | Performed by: PHYSICIAN ASSISTANT

## 2021-05-19 PROCEDURE — 77030013079 HC BLNKT BAIR HGGR 3M -A: Performed by: ANESTHESIOLOGY

## 2021-05-19 PROCEDURE — 74011250637 HC RX REV CODE- 250/637: Performed by: PHYSICIAN ASSISTANT

## 2021-05-19 PROCEDURE — 71045 X-RAY EXAM CHEST 1 VIEW: CPT

## 2021-05-19 PROCEDURE — 77030003029 HC SUT VCRL J&J -B: Performed by: ORTHOPAEDIC SURGERY

## 2021-05-19 PROCEDURE — 74011000250 HC RX REV CODE- 250: Performed by: NURSE ANESTHETIST, CERTIFIED REGISTERED

## 2021-05-19 PROCEDURE — 84100 ASSAY OF PHOSPHORUS: CPT

## 2021-05-19 PROCEDURE — 74011000250 HC RX REV CODE- 250: Performed by: PHYSICIAN ASSISTANT

## 2021-05-19 PROCEDURE — 93306 TTE W/DOPPLER COMPLETE: CPT

## 2021-05-19 PROCEDURE — 36415 COLL VENOUS BLD VENIPUNCTURE: CPT

## 2021-05-19 PROCEDURE — 80053 COMPREHEN METABOLIC PANEL: CPT

## 2021-05-19 PROCEDURE — 74011000258 HC RX REV CODE- 258: Performed by: NURSE ANESTHETIST, CERTIFIED REGISTERED

## 2021-05-19 PROCEDURE — 77030018673: Performed by: ORTHOPAEDIC SURGERY

## 2021-05-19 PROCEDURE — 74011250636 HC RX REV CODE- 250/636: Performed by: NURSE ANESTHETIST, CERTIFIED REGISTERED

## 2021-05-19 PROCEDURE — 77010033678 HC OXYGEN DAILY

## 2021-05-19 PROCEDURE — 74011000250 HC RX REV CODE- 250: Performed by: ANESTHESIOLOGY

## 2021-05-19 PROCEDURE — 76210000000 HC OR PH I REC 2 TO 2.5 HR: Performed by: ORTHOPAEDIC SURGERY

## 2021-05-19 PROCEDURE — 77030026438 HC STYL ET INTUB CARD -A: Performed by: ANESTHESIOLOGY

## 2021-05-19 PROCEDURE — 77030005513 HC CATH URETH FOL11 MDII -B: Performed by: ORTHOPAEDIC SURGERY

## 2021-05-19 PROCEDURE — 0SRS01Z REPLACEMENT OF LEFT HIP JOINT, FEMORAL SURFACE WITH METAL SYNTHETIC SUBSTITUTE, OPEN APPROACH: ICD-10-PCS | Performed by: ORTHOPAEDIC SURGERY

## 2021-05-19 PROCEDURE — 84443 ASSAY THYROID STIM HORMONE: CPT

## 2021-05-19 PROCEDURE — 77030040361 HC SLV COMPR DVT MDII -B

## 2021-05-19 PROCEDURE — 83735 ASSAY OF MAGNESIUM: CPT

## 2021-05-19 PROCEDURE — 85025 COMPLETE CBC W/AUTO DIFF WBC: CPT

## 2021-05-19 PROCEDURE — L1830 KO IMMOB CANVAS LONG PRE OTS: HCPCS | Performed by: ORTHOPAEDIC SURGERY

## 2021-05-19 PROCEDURE — 2709999900 HC NON-CHARGEABLE SUPPLY: Performed by: ORTHOPAEDIC SURGERY

## 2021-05-19 PROCEDURE — 76060000034 HC ANESTHESIA 1.5 TO 2 HR: Performed by: ORTHOPAEDIC SURGERY

## 2021-05-19 PROCEDURE — 99223 1ST HOSP IP/OBS HIGH 75: CPT | Performed by: INTERNAL MEDICINE

## 2021-05-19 PROCEDURE — 77030031139 HC SUT VCRL2 J&J -A: Performed by: ORTHOPAEDIC SURGERY

## 2021-05-19 PROCEDURE — 74011250637 HC RX REV CODE- 250/637: Performed by: NURSE ANESTHETIST, CERTIFIED REGISTERED

## 2021-05-19 PROCEDURE — 77030033067 HC SUT PDO STRATFX SPIR J&J -B: Performed by: ORTHOPAEDIC SURGERY

## 2021-05-19 DEVICE — IMPLANTABLE DEVICE: Type: IMPLANTABLE DEVICE | Site: HIP | Status: FUNCTIONAL

## 2021-05-19 DEVICE — STEM FEM L130MM DIA10MM 135DEG TI POR PLSM SPR HIP STD: Type: IMPLANTABLE DEVICE | Site: HIP | Status: FUNCTIONAL

## 2021-05-19 DEVICE — HIP H4 HEMI UNIV BIPLR IMPL CAPPED H4: Type: IMPLANTABLE DEVICE | Status: FUNCTIONAL

## 2021-05-19 DEVICE — HEAD FEM DIA28MM +3MM OFFSET FEM HIP CO CHROM TYP 1 PRI MOD: Type: IMPLANTABLE DEVICE | Site: HIP | Status: FUNCTIONAL

## 2021-05-19 RX ORDER — PRAVASTATIN SODIUM 20 MG/1
40 TABLET ORAL DAILY
Status: DISCONTINUED | OUTPATIENT
Start: 2021-05-19 | End: 2021-05-22 | Stop reason: HOSPADM

## 2021-05-19 RX ORDER — MIDAZOLAM HYDROCHLORIDE 1 MG/ML
INJECTION, SOLUTION INTRAMUSCULAR; INTRAVENOUS AS NEEDED
Status: DISCONTINUED | OUTPATIENT
Start: 2021-05-19 | End: 2021-05-19 | Stop reason: HOSPADM

## 2021-05-19 RX ORDER — NEOSTIGMINE METHYLSULFATE 1 MG/ML
INJECTION, SOLUTION INTRAVENOUS AS NEEDED
Status: DISCONTINUED | OUTPATIENT
Start: 2021-05-19 | End: 2021-05-19 | Stop reason: HOSPADM

## 2021-05-19 RX ORDER — SODIUM CHLORIDE, SODIUM LACTATE, POTASSIUM CHLORIDE, CALCIUM CHLORIDE 600; 310; 30; 20 MG/100ML; MG/100ML; MG/100ML; MG/100ML
INJECTION, SOLUTION INTRAVENOUS
Status: DISCONTINUED | OUTPATIENT
Start: 2021-05-19 | End: 2021-05-19 | Stop reason: HOSPADM

## 2021-05-19 RX ORDER — ENOXAPARIN SODIUM 100 MG/ML
40 INJECTION SUBCUTANEOUS DAILY
Status: DISCONTINUED | OUTPATIENT
Start: 2021-05-20 | End: 2021-05-22 | Stop reason: HOSPADM

## 2021-05-19 RX ORDER — SUCCINYLCHOLINE CHLORIDE 20 MG/ML
INJECTION INTRAMUSCULAR; INTRAVENOUS AS NEEDED
Status: DISCONTINUED | OUTPATIENT
Start: 2021-05-19 | End: 2021-05-19 | Stop reason: HOSPADM

## 2021-05-19 RX ORDER — ROCURONIUM BROMIDE 10 MG/ML
INJECTION, SOLUTION INTRAVENOUS AS NEEDED
Status: DISCONTINUED | OUTPATIENT
Start: 2021-05-19 | End: 2021-05-19 | Stop reason: HOSPADM

## 2021-05-19 RX ORDER — ONDANSETRON 2 MG/ML
4 INJECTION INTRAMUSCULAR; INTRAVENOUS
Status: ACTIVE | OUTPATIENT
Start: 2021-05-19 | End: 2021-05-20

## 2021-05-19 RX ORDER — GLYCOPYRROLATE 0.2 MG/ML
INJECTION INTRAMUSCULAR; INTRAVENOUS AS NEEDED
Status: DISCONTINUED | OUTPATIENT
Start: 2021-05-19 | End: 2021-05-19 | Stop reason: HOSPADM

## 2021-05-19 RX ORDER — OXYCODONE HYDROCHLORIDE 5 MG/1
2.5 TABLET ORAL
Status: DISCONTINUED | OUTPATIENT
Start: 2021-05-19 | End: 2021-05-22 | Stop reason: HOSPADM

## 2021-05-19 RX ORDER — DEXTROSE, SODIUM CHLORIDE, AND POTASSIUM CHLORIDE 5; .45; .15 G/100ML; G/100ML; G/100ML
75 INJECTION INTRAVENOUS CONTINUOUS
Status: DISCONTINUED | OUTPATIENT
Start: 2021-05-19 | End: 2021-05-19

## 2021-05-19 RX ORDER — HYDROMORPHONE HYDROCHLORIDE 1 MG/ML
INJECTION, SOLUTION INTRAMUSCULAR; INTRAVENOUS; SUBCUTANEOUS
Status: COMPLETED
Start: 2021-05-19 | End: 2021-05-19

## 2021-05-19 RX ORDER — FERROUS SULFATE, DRIED 160(50) MG
1 TABLET, EXTENDED RELEASE ORAL
Status: DISCONTINUED | OUTPATIENT
Start: 2021-05-20 | End: 2021-05-22 | Stop reason: HOSPADM

## 2021-05-19 RX ORDER — PROPOFOL 10 MG/ML
INJECTION, EMULSION INTRAVENOUS AS NEEDED
Status: DISCONTINUED | OUTPATIENT
Start: 2021-05-19 | End: 2021-05-19 | Stop reason: HOSPADM

## 2021-05-19 RX ORDER — HYDROMORPHONE HYDROCHLORIDE 1 MG/ML
1 INJECTION, SOLUTION INTRAMUSCULAR; INTRAVENOUS; SUBCUTANEOUS ONCE
Status: DISCONTINUED | OUTPATIENT
Start: 2021-05-19 | End: 2021-05-19 | Stop reason: HOSPADM

## 2021-05-19 RX ORDER — ACETAMINOPHEN 325 MG/1
650 TABLET ORAL EVERY 6 HOURS
Status: DISCONTINUED | OUTPATIENT
Start: 2021-05-20 | End: 2021-05-22 | Stop reason: HOSPADM

## 2021-05-19 RX ORDER — DEXAMETHASONE SODIUM PHOSPHATE 4 MG/ML
INJECTION, SOLUTION INTRA-ARTICULAR; INTRALESIONAL; INTRAMUSCULAR; INTRAVENOUS; SOFT TISSUE AS NEEDED
Status: DISCONTINUED | OUTPATIENT
Start: 2021-05-19 | End: 2021-05-19 | Stop reason: HOSPADM

## 2021-05-19 RX ORDER — HYDROMORPHONE HYDROCHLORIDE 2 MG/ML
INJECTION, SOLUTION INTRAMUSCULAR; INTRAVENOUS; SUBCUTANEOUS AS NEEDED
Status: DISCONTINUED | OUTPATIENT
Start: 2021-05-19 | End: 2021-05-19 | Stop reason: HOSPADM

## 2021-05-19 RX ORDER — ENOXAPARIN SODIUM 100 MG/ML
40 INJECTION SUBCUTANEOUS EVERY 24 HOURS
Status: DISCONTINUED | OUTPATIENT
Start: 2021-05-20 | End: 2021-05-19

## 2021-05-19 RX ORDER — ALPRAZOLAM 0.5 MG/1
0.5 TABLET ORAL
Status: DISCONTINUED | OUTPATIENT
Start: 2021-05-19 | End: 2021-05-22 | Stop reason: HOSPADM

## 2021-05-19 RX ORDER — LEVOTHYROXINE SODIUM 100 UG/1
100 TABLET ORAL
Status: DISCONTINUED | OUTPATIENT
Start: 2021-05-19 | End: 2021-05-22 | Stop reason: HOSPADM

## 2021-05-19 RX ORDER — METOPROLOL TARTRATE 5 MG/5ML
INJECTION INTRAVENOUS AS NEEDED
Status: DISCONTINUED | OUTPATIENT
Start: 2021-05-19 | End: 2021-05-19 | Stop reason: HOSPADM

## 2021-05-19 RX ORDER — FENTANYL CITRATE 50 UG/ML
INJECTION, SOLUTION INTRAMUSCULAR; INTRAVENOUS AS NEEDED
Status: DISCONTINUED | OUTPATIENT
Start: 2021-05-19 | End: 2021-05-19 | Stop reason: HOSPADM

## 2021-05-19 RX ORDER — ALBUTEROL SULFATE 90 UG/1
AEROSOL, METERED RESPIRATORY (INHALATION) AS NEEDED
Status: DISCONTINUED | OUTPATIENT
Start: 2021-05-19 | End: 2021-05-19 | Stop reason: HOSPADM

## 2021-05-19 RX ORDER — NALOXONE HYDROCHLORIDE 0.4 MG/ML
0.4 INJECTION, SOLUTION INTRAMUSCULAR; INTRAVENOUS; SUBCUTANEOUS AS NEEDED
Status: DISCONTINUED | OUTPATIENT
Start: 2021-05-19 | End: 2021-05-22 | Stop reason: HOSPADM

## 2021-05-19 RX ORDER — SODIUM CHLORIDE 0.9 % (FLUSH) 0.9 %
5-40 SYRINGE (ML) INJECTION AS NEEDED
Status: DISCONTINUED | OUTPATIENT
Start: 2021-05-19 | End: 2021-05-22 | Stop reason: HOSPADM

## 2021-05-19 RX ORDER — PHENYLEPHRINE HCL IN 0.9% NACL 0.4MG/10ML
SYRINGE (ML) INTRAVENOUS AS NEEDED
Status: DISCONTINUED | OUTPATIENT
Start: 2021-05-19 | End: 2021-05-19 | Stop reason: HOSPADM

## 2021-05-19 RX ORDER — LIDOCAINE HYDROCHLORIDE 20 MG/ML
INJECTION, SOLUTION EPIDURAL; INFILTRATION; INTRACAUDAL; PERINEURAL AS NEEDED
Status: DISCONTINUED | OUTPATIENT
Start: 2021-05-19 | End: 2021-05-19 | Stop reason: HOSPADM

## 2021-05-19 RX ORDER — ALBUTEROL SULFATE 0.83 MG/ML
SOLUTION RESPIRATORY (INHALATION)
Status: DISCONTINUED
Start: 2021-05-19 | End: 2021-05-19 | Stop reason: WASHOUT

## 2021-05-19 RX ORDER — SODIUM CHLORIDE 9 MG/ML
50 INJECTION, SOLUTION INTRAVENOUS CONTINUOUS
Status: DISCONTINUED | OUTPATIENT
Start: 2021-05-19 | End: 2021-05-20

## 2021-05-19 RX ORDER — ONDANSETRON 2 MG/ML
INJECTION INTRAMUSCULAR; INTRAVENOUS AS NEEDED
Status: DISCONTINUED | OUTPATIENT
Start: 2021-05-19 | End: 2021-05-19 | Stop reason: HOSPADM

## 2021-05-19 RX ORDER — FAMOTIDINE 10 MG/ML
INJECTION INTRAVENOUS AS NEEDED
Status: DISCONTINUED | OUTPATIENT
Start: 2021-05-19 | End: 2021-05-19 | Stop reason: HOSPADM

## 2021-05-19 RX ORDER — HYDROMORPHONE HYDROCHLORIDE 1 MG/ML
1 INJECTION, SOLUTION INTRAMUSCULAR; INTRAVENOUS; SUBCUTANEOUS
Status: DISCONTINUED | OUTPATIENT
Start: 2021-05-19 | End: 2021-05-19

## 2021-05-19 RX ORDER — SODIUM CHLORIDE 0.9 % (FLUSH) 0.9 %
5-40 SYRINGE (ML) INJECTION EVERY 8 HOURS
Status: DISCONTINUED | OUTPATIENT
Start: 2021-05-19 | End: 2021-05-22 | Stop reason: HOSPADM

## 2021-05-19 RX ORDER — OXYCODONE HYDROCHLORIDE 5 MG/1
5 TABLET ORAL
Status: DISCONTINUED | OUTPATIENT
Start: 2021-05-19 | End: 2021-05-22 | Stop reason: HOSPADM

## 2021-05-19 RX ORDER — AMOXICILLIN 250 MG
1 CAPSULE ORAL 2 TIMES DAILY
Status: DISCONTINUED | OUTPATIENT
Start: 2021-05-19 | End: 2021-05-22 | Stop reason: HOSPADM

## 2021-05-19 RX ORDER — HYDROMORPHONE HYDROCHLORIDE 1 MG/ML
0.5 INJECTION, SOLUTION INTRAMUSCULAR; INTRAVENOUS; SUBCUTANEOUS
Status: ACTIVE | OUTPATIENT
Start: 2021-05-19 | End: 2021-05-20

## 2021-05-19 RX ORDER — POLYETHYLENE GLYCOL 3350 17 G/17G
17 POWDER, FOR SOLUTION ORAL DAILY
Status: DISCONTINUED | OUTPATIENT
Start: 2021-05-20 | End: 2021-05-22 | Stop reason: HOSPADM

## 2021-05-19 RX ORDER — SERTRALINE HYDROCHLORIDE 50 MG/1
200 TABLET, FILM COATED ORAL
Status: DISCONTINUED | OUTPATIENT
Start: 2021-05-19 | End: 2021-05-22 | Stop reason: HOSPADM

## 2021-05-19 RX ORDER — CEFAZOLIN SODIUM 1 G/3ML
INJECTION, POWDER, FOR SOLUTION INTRAMUSCULAR; INTRAVENOUS AS NEEDED
Status: DISCONTINUED | OUTPATIENT
Start: 2021-05-19 | End: 2021-05-19 | Stop reason: HOSPADM

## 2021-05-19 RX ORDER — IPRATROPIUM BROMIDE AND ALBUTEROL SULFATE 2.5; .5 MG/3ML; MG/3ML
3 SOLUTION RESPIRATORY (INHALATION)
Status: COMPLETED | OUTPATIENT
Start: 2021-05-19 | End: 2021-05-19

## 2021-05-19 RX ORDER — FACIAL-BODY WIPES
10 EACH TOPICAL DAILY PRN
Status: DISCONTINUED | OUTPATIENT
Start: 2021-05-21 | End: 2021-05-22 | Stop reason: HOSPADM

## 2021-05-19 RX ADMIN — DEXAMETHASONE SODIUM PHOSPHATE 4 MG: 4 INJECTION, SOLUTION INTRAMUSCULAR; INTRAVENOUS at 16:09

## 2021-05-19 RX ADMIN — HYDROMORPHONE HYDROCHLORIDE 0.4 MG: 2 INJECTION INTRAMUSCULAR; INTRAVENOUS; SUBCUTANEOUS at 17:19

## 2021-05-19 RX ADMIN — Medication 3 MG: at 17:16

## 2021-05-19 RX ADMIN — Medication 10 ML: at 07:05

## 2021-05-19 RX ADMIN — PRAVASTATIN SODIUM 40 MG: 20 TABLET ORAL at 08:45

## 2021-05-19 RX ADMIN — HYDRALAZINE HYDROCHLORIDE 20 MG: 20 INJECTION INTRAMUSCULAR; INTRAVENOUS at 12:52

## 2021-05-19 RX ADMIN — DOCUSATE SODIUM 50MG AND SENNOSIDES 8.6MG 1 TABLET: 8.6; 5 TABLET, FILM COATED ORAL at 21:13

## 2021-05-19 RX ADMIN — GLYCOPYRROLATE 0.2 MG: 0.2 INJECTION INTRAMUSCULAR; INTRAVENOUS at 17:16

## 2021-05-19 RX ADMIN — PROPOFOL 100 MG: 10 INJECTION, EMULSION INTRAVENOUS at 16:03

## 2021-05-19 RX ADMIN — ONDANSETRON HYDROCHLORIDE 4 MG: 2 SOLUTION INTRAMUSCULAR; INTRAVENOUS at 17:16

## 2021-05-19 RX ADMIN — ALBUTEROL SULFATE 8 PUFF: 90 AEROSOL, METERED RESPIRATORY (INHALATION) at 17:49

## 2021-05-19 RX ADMIN — LEVOTHYROXINE SODIUM 100 MCG: 0.1 TABLET ORAL at 08:44

## 2021-05-19 RX ADMIN — SODIUM CHLORIDE, POTASSIUM CHLORIDE, SODIUM LACTATE AND CALCIUM CHLORIDE: 600; 310; 30; 20 INJECTION, SOLUTION INTRAVENOUS at 17:03

## 2021-05-19 RX ADMIN — FENTANYL CITRATE 100 MCG: 50 INJECTION, SOLUTION INTRAMUSCULAR; INTRAVENOUS at 16:03

## 2021-05-19 RX ADMIN — SUCCINYLCHOLINE CHLORIDE 100 MG: 20 INJECTION, SOLUTION INTRAMUSCULAR; INTRAVENOUS at 16:03

## 2021-05-19 RX ADMIN — HYDROMORPHONE HYDROCHLORIDE 1 MG: 1 INJECTION, SOLUTION INTRAMUSCULAR; INTRAVENOUS; SUBCUTANEOUS at 06:30

## 2021-05-19 RX ADMIN — MORPHINE SULFATE 2 MG: 2 INJECTION, SOLUTION INTRAMUSCULAR; INTRAVENOUS at 02:41

## 2021-05-19 RX ADMIN — PHENYLEPHRINE HYDROCHLORIDE 50 MCG/MIN: 10 INJECTION INTRAVENOUS at 16:14

## 2021-05-19 RX ADMIN — ROCURONIUM BROMIDE 15 MG: 10 INJECTION INTRAVENOUS at 16:09

## 2021-05-19 RX ADMIN — AMLODIPINE BESYLATE 5 MG: 5 TABLET ORAL at 21:08

## 2021-05-19 RX ADMIN — HYDROMORPHONE HYDROCHLORIDE 0.4 MG: 2 INJECTION INTRAMUSCULAR; INTRAVENOUS; SUBCUTANEOUS at 17:30

## 2021-05-19 RX ADMIN — ALPRAZOLAM 0.5 MG: 0.5 TABLET ORAL at 12:48

## 2021-05-19 RX ADMIN — SERTRALINE HYDROCHLORIDE 200 MG: 50 TABLET, FILM COATED ORAL at 21:13

## 2021-05-19 RX ADMIN — ROCURONIUM BROMIDE 10 MG: 10 INJECTION INTRAVENOUS at 17:03

## 2021-05-19 RX ADMIN — OXYCODONE 5 MG: 5 TABLET ORAL at 22:07

## 2021-05-19 RX ADMIN — MIDAZOLAM HYDROCHLORIDE 1 MG: 2 INJECTION, SOLUTION INTRAMUSCULAR; INTRAVENOUS at 15:58

## 2021-05-19 RX ADMIN — POTASSIUM CHLORIDE, DEXTROSE MONOHYDRATE AND SODIUM CHLORIDE 75 ML/HR: 150; 5; 450 INJECTION, SOLUTION INTRAVENOUS at 08:49

## 2021-05-19 RX ADMIN — Medication 100 MCG: at 16:03

## 2021-05-19 RX ADMIN — MIDAZOLAM HYDROCHLORIDE 1 MG: 2 INJECTION, SOLUTION INTRAMUSCULAR; INTRAVENOUS at 15:56

## 2021-05-19 RX ADMIN — ROCURONIUM BROMIDE 5 MG: 10 INJECTION INTRAVENOUS at 16:03

## 2021-05-19 RX ADMIN — SODIUM CHLORIDE 125 ML/HR: 9 INJECTION, SOLUTION INTRAVENOUS at 21:13

## 2021-05-19 RX ADMIN — LABETALOL HYDROCHLORIDE 100 MG: 100 TABLET, FILM COATED ORAL at 22:07

## 2021-05-19 RX ADMIN — SODIUM CHLORIDE, POTASSIUM CHLORIDE, SODIUM LACTATE AND CALCIUM CHLORIDE: 600; 310; 30; 20 INJECTION, SOLUTION INTRAVENOUS at 15:56

## 2021-05-19 RX ADMIN — PANTOPRAZOLE SODIUM 40 MG: 40 INJECTION, POWDER, FOR SOLUTION INTRAVENOUS at 21:08

## 2021-05-19 RX ADMIN — FAMOTIDINE 20 MG: 10 INJECTION INTRAVENOUS at 17:03

## 2021-05-19 RX ADMIN — MORPHINE SULFATE 2 MG: 2 INJECTION, SOLUTION INTRAMUSCULAR; INTRAVENOUS at 09:57

## 2021-05-19 RX ADMIN — FENTANYL CITRATE 50 MCG: 50 INJECTION, SOLUTION INTRAMUSCULAR; INTRAVENOUS at 15:31

## 2021-05-19 RX ADMIN — HYDROMORPHONE HYDROCHLORIDE 0.4 MG: 2 INJECTION INTRAMUSCULAR; INTRAVENOUS; SUBCUTANEOUS at 16:46

## 2021-05-19 RX ADMIN — CEFAZOLIN SODIUM 2 G: 1 POWDER, FOR SOLUTION INTRAMUSCULAR; INTRAVENOUS at 16:15

## 2021-05-19 RX ADMIN — ROCURONIUM BROMIDE 10 MG: 10 INJECTION INTRAVENOUS at 16:46

## 2021-05-19 RX ADMIN — Medication 100 MCG: at 16:18

## 2021-05-19 RX ADMIN — SUGAMMADEX 200 MG: 100 INJECTION, SOLUTION INTRAVENOUS at 18:35

## 2021-05-19 RX ADMIN — FENTANYL CITRATE 50 MCG: 50 INJECTION, SOLUTION INTRAMUSCULAR; INTRAVENOUS at 16:35

## 2021-05-19 RX ADMIN — METOPROLOL TARTRATE 2 MG: 5 INJECTION, SOLUTION INTRAVENOUS at 17:37

## 2021-05-19 RX ADMIN — METOPROLOL TARTRATE 2 MG: 5 INJECTION, SOLUTION INTRAVENOUS at 17:06

## 2021-05-19 RX ADMIN — FENTANYL CITRATE 50 MCG: 50 INJECTION, SOLUTION INTRAMUSCULAR; INTRAVENOUS at 16:29

## 2021-05-19 RX ADMIN — Medication 10 ML: at 23:01

## 2021-05-19 RX ADMIN — LIDOCAINE HYDROCHLORIDE 100 MG: 20 INJECTION, SOLUTION EPIDURAL; INFILTRATION; INTRACAUDAL; PERINEURAL at 16:03

## 2021-05-19 RX ADMIN — IPRATROPIUM BROMIDE AND ALBUTEROL SULFATE 3 ML: .5; 2.5 SOLUTION RESPIRATORY (INHALATION) at 18:20

## 2021-05-19 RX ADMIN — CEFAZOLIN 2 G: 1 INJECTION, POWDER, FOR SOLUTION INTRAMUSCULAR; INTRAVENOUS at 21:13

## 2021-05-19 RX ADMIN — LABETALOL HYDROCHLORIDE 100 MG: 100 TABLET, FILM COATED ORAL at 08:39

## 2021-05-19 RX ADMIN — METOPROLOL TARTRATE 1 MG: 5 INJECTION, SOLUTION INTRAVENOUS at 17:03

## 2021-05-19 NOTE — PROGRESS NOTES
5/19/2021  10:53 AM    RUR:  11%  Risk Level: [x]Low []Moderate []High  Value-based purchasing: [] Yes [x] No  Bundle patient: [x] Yes [] No   Specify: [facility] Major Joint Lower Extremity Replacement    Transition of care plan:  1. Cardiology eval; plan for ECHO; plan for left hip surgery if cleared by cardiology   2. Pt lives alone at home, needs TBD pending clinical progression  3. CM verified brother, Sonali Bassett, phone number: [phone]; NOK and primary decision maker  4. Outpatient follow-up. 5. Pt's family to transport  6.  CM to follow for needs    Eid Najera RN

## 2021-05-19 NOTE — BRIEF OP NOTE
Brief Postoperative Note    Patient: Tahir Gentile  YOB: 1955  MRN: [id number]    Date of Procedure: 5/19/2021     Pre-Op Diagnosis: left hip fracture    Post-Op Diagnosis: Same as preoperative diagnosis. Procedure(s):  LEFT HIP HEMIARTHROPLASTY    Surgeon(s):  Bettie Shirley MD    Surgical Assistant: Physician Assistant: Marixa Ramsey PA-C    Anesthesia: General     Estimated Blood Loss (mL): minimal     Complications: None    Specimens: * No specimens in log *     Implants:   Implant Name Type Inv. Item Serial No.  Lot No. LRB No. Used Action   STEM FEM L130MM [id number] 135DEG TI POR PLSM SPR HIP STD - SNA  STEM FEM L130MM [id number] 135DEG TI POR PLSM SPR HIP STD NA [employer] [id number] Left 1 Implanted   HEAD FEM MOD TAPR 28MM +3MM NK --  - SNA  HEAD FEM MOD TAPR 28MM +3MM NK --  NA [employer]WD [id number] Left 1 Implanted   CUP ACET BPLR [id number] TI --  - SNA  CUP ACET BPLR [id number] TI --  NA [employer]WD [id number] Left 1 Implanted       Drains: * No LDAs found *    Findings: As above.      Electronically Signed by Sabas Elizabeth PA-C on 5/19/2021 at 6:07 PM stated

## 2021-05-19 NOTE — CONSULTS
[address]     Name:  Bella Mcgregor  MR#:  [id number]  :  1955  ACCOUNT #:  [de-identified]  DATE OF SERVICE:  2021      CHIEF COMPLAINT:  Left hip pain. HISTORY OF PRESENT ILLNESS:  The patient is a pleasant 30-year-old female who was running her cat out yesterday evening and slipped and fell onto her backside, noting immediate pain. She was unable to ambulate and was brought to the Emergency Department and found to have hip fracture. She has been admitted to the Hospitalist Service, seen by Cardiology. An echo has been performed and she has been cleared for surgery. Complaining of pain in the left groin. Denies any numbness or tingling. Denies any other injury. PAST MEDICAL HISTORY:  OCD, hypothyroidism, hiatal hernia. MEDICATIONS:  Per admission medication reconciliation. ALLERGIES:  Per admission medication reconciliation. SOCIAL HISTORY:  She does not smoke. She does not drink. She lives alone. Does not use any assistive devices for ambulation. FAMILY HISTORY:  Notable for hypertension. REVIEW OF SYSTEMS:  She did have some dyspnea on exertion, but denies any currently. Complaining of pain in the left lower extremity. Otherwise negative for 10-system review. PHYSICAL EXAMINATION:  VITAL SIGNS:  Temperature 97.9, pulse is 117, blood pressure 155/101, respirations 20, oxygenation 93% on 2 L nasal cannula. GENERAL:  She is alert and oriented, in no apparent distress. Breathing is unlabored. Affect is appropriate. HEENT:  Sclerae nonicteric. ABDOMEN:  Soft, nondistended. EXTREMITIES:  Not asked to ambulate secondary to known injury. Examination of bilateral lower extremities demonstrates skin is intact. The left lower extremity shortened and externally rotated. She can plantar flex and dorsiflex her ankle. Strength testing deferred. She is warm and well perfused, grossly sensate. Other side unremarkable.     IMAGING STUDIES:  Imaging on the left hip is available for my review, demonstrating a displaced subcapital femoral neck fracture. LABORATORY DATA:  White blood cell count 12.7, hemoglobin 13.3, hematocrit 40.0, platelets 188. BUN 14, creatine 0.72.    ASSESSMENT:  A 40-year-old female with displaced a left femoral neck fracture. PLAN:  We discussed treatment options. We discussed risks and benefits of surgery, and I recommended hip hemiarthroplasty. I reviewed her case with my hip specialist partner who recommended hip hemiarthroplasty versus total hip in her case. We discussed risks of bleeding, infection, damage to surrounding nerve and blood vessels, persistent pain, stiffness, loss of function, failure to heal, fracture loosening, need for further surgery, dislocation. She signed the informed consent. We will plan for this directly. Appreciate medicine and cardiology care.       MD RICKI De Luna/[email or web address]  D:  05/19/2021 16:05  T:  05/19/2021 19:58  JOB #:  [id number]  CC:  Lilliam Franz MD

## 2021-05-19 NOTE — PERIOP NOTES
Arrived to PACU via bed. Placed on cardiac monitor. Sat 85% on 7L via oxygen mask/oral airway. Pt not responding to verbal commands. Lungs clear, shallow breaths at approximately 14/min. Pulse ox repositioned to ensure proper waveform. Anesthesia called to bedside to evaluate pt.

## 2021-05-19 NOTE — CONSULTS
Leny Rodriguez MD., [facility]    [address] [place], [address] [facility]    Office [phone],[facility] [phone]           5/19/2021     Admit Date: 5/18/2021      Sam Sweet is a 78 y.o. female admitted for Hip fracture ([address] Zoltan Sat. Consult requested by Myah Hill MD       Assessment/Plan:    L Hip fracture   HTN  HLD  Severe AS      Plan:  ECHO pending  BP elevated on admn - On Normodyne  Aortic Stenosis - severe by echo; mildly symptomatic per pt's hx - was walking a mile but had to \" rest\" a few times. D/w pt. Patient - moderate risk - understands the cardiac risk and wishes to proceed. Can proceed with surgery. Monitor volume status and avoid hypotension intraop period. Will continue to follow. 05/18/21    ECHO ADULT COMPLETE 05/19/2021 5/19/2021    Interpretation Summary  · Image quality for this study was technically difficult. · Echo study was limited due to patient's condition. · LV: Estimated LVEF is 55 - 60%. Normal cavity size and systolic function (ejection fraction normal). Left ventricle not well visualized. Wall motion: unable to assess due to limited image quality. · AV: Aortic valve leaflet calcification present. Severe aortic valve stenosis is present. · TV: Mild tricuspid valve regurgitation is present. Pulmonary hypertension found to be mild. · MV: Mitral valve non-specific thickening. Signed by: Tanya Miranda MD on 5/19/2021  8:57 AM      Please do not hesitate to contact us with questions or concerns. See note below for details. Leny Rodriguez MD      Cardiac Testing/Procedures: A. Cardiac Cath/PCI:    B.ECHO/JANY:    C.StressNuclear/Stress ECHO/Stress test:    D.Vascular:    E. EP:    F. Miscellaneous:    History:     Patient  is a 78 y.o. female admitted for L Hip fracture s/p fall. Hx of HNT/HLD/hiatal hernia. No CP/dyspnea. Hx of HTN/HLD/Hypothyroidism.      Trop <0.05; Cr 0.77/0.72    PMH/PSH/FH/Soc Hx:     Past Medical History: Diagnosis Date    Hernia, hiatal     Hypothyroidism     OCD (obsessive compulsive disorder)      FHx - No CAD    Social History     Tobacco Use    Smoking status: Never Smoker    Smokeless tobacco: Never Used   Substance Use Topics    Alcohol use: Not Currently    Drug use: Never           Medications:       Current Facility-Administered Medications   Medication Dose Route Frequency    HYDROmorphone (DILAUDID) syringe 1 mg  1 mg IntraVENous Q4H PRN    dextrose 5% - 0.45% NaCl with KCl 20 mEq/L infusion  75 mL/hr IntraVENous CONTINUOUS    ALPRAZolam (XANAX) tablet 0.5 mg  0.5 mg Oral TID PRN    levothyroxine (SYNTHROID) tablet 100 mcg  100 mcg Oral ACB    pravastatin (PRAVACHOL) tablet 40 mg  40 mg Oral DAILY    sertraline (ZOLOFT) tablet 200 mg  200 mg Oral QHS    sodium chloride (NS) flush 5-40 mL  5-40 mL IntraVENous Q8H    sodium chloride (NS) flush 5-40 mL  5-40 mL IntraVENous PRN    acetaminophen (TYLENOL) tablet 650 mg  650 mg Oral Q6H PRN    Or    acetaminophen (TYLENOL) suppository 650 mg  650 mg Rectal Q6H PRN    polyethylene glycol (MIRALAX) packet 17 g  17 g Oral DAILY PRN    morphine injection 2 mg  2 mg IntraVENous Q4H PRN    morphine injection 2 mg  2 mg IntraVENous Q4H PRN    naloxone (NARCAN) injection 0.4 mg  0.4 mg IntraVENous EVERY 2 MINUTES AS NEEDED    amLODIPine (NORVASC) tablet 5 mg  5 mg Oral DAILY    pantoprazole (PROTONIX) 40 mg in 0.9% sodium chloride 10 mL injection  40 mg IntraVENous Q24H    labetaloL (NORMODYNE) tablet 100 mg  100 mg Oral Q12H    hydrALAZINE (APRESOLINE) 20 mg/mL injection 20 mg  20 mg IntraVENous Q6H PRN       Review of Systems:     As in HPI - all other 10 point ROS negative    Physical Exam:       Visit Vitals  BP (!) 169/98 (BP 1 Location: Right upper arm, BP Patient Position: At rest)   Pulse (!) 104   Temp 98.8 °F (37.1 °C)   Resp 20   Ht 5' 8\" (1.727 m)   Wt 160 lb 0.9 oz (72.6 kg)   SpO2 91%   BMI 24.34 kg/m²         Telemetry: normal sinus rhythm    Gen: Well-developed, well-nourished,  Neck: Supple,No JVD,   Resp: No accessory muscle use, Clear breath sounds, No rales or rhonchi  Card: Regular Rate,Rythm,Normal S1, S2, 3/6 sys murmur +;   Abd:  Soft, BS+,   MSK: No cyanosis  Skin: No rashes    Neuro: moving all four extremities , follows commands appropriately  Psych:  Good insight, oriented to person, place , alert, Nml Affect  LE: No edema    EKG:        Cxray: Reviewed     LABS: Reviewed      Care Plan discussed with: Patient and Nursing Staff      Total time:      mins     Bernard Flor MD

## 2021-05-19 NOTE — PERIOP NOTES
TRANSFER - IN REPORT:    Verbal report received from [facility] (name) on Leticia Lemus  being received from Room 336 (unit) for ordered procedure      Report consisted of patients Situation, Background, Assessment and   Recommendations(SBAR). Information from the following report(s) SBAR, Kardex, ED Summary, [facility] and Recent Results was reviewed with the receiving nurse. Opportunity for questions and clarification was provided. Assessment completed upon patients arrival to unit and care assumed.        Patient Fall Protocol  Yellow arm band applied to patient and yellow non skid socks placed on  Bed in low position, all side rails up, call bell in reach  Pt and Family instructed in \"call- don't fall\" protocol   -use your call bell, wait for assistance, staff not family will assist you to get up and move about   Pt and family verbalize understanding of fall precautions and the \"call don't fall\" Protocol

## 2021-05-19 NOTE — PROGRESS NOTES
Shift Change:      Bedside and Verbal shift change report given to [address] Blue (oncoming nurse) by Timo Bah (offgoing nurse). Report included the following information SBAR, Kardex and Recent Results. Shift Summary: This patient was assisted with Intentional Toileting every 2 hours during this shift. Documentation of ambulation and output reflected on Flowsheet    1330: patient diaphoretic, BP up to 189/102, hydralazine given, BP now down to 163/118. . MD notified. 1408: patient taken down to OR  1840: patient will return to floor after shift change per PACU RN. Shift Change:      Bedside and Verbal shift change report given to Vianey Hubbard (oncoming nurse) by [address] Blue (offgoing nurse). Report included the following information SBAR, Kardex and Recent Results.

## 2021-05-19 NOTE — PROGRESS NOTES
Admission Medication Reconciliation:     Information obtained from:    -Patient  -RxQuery data available¹: Yes     Comments/Recommendations:   Patient able to confirm name, , allergies, and preferred pharmacy  Patient reports compliance to medications; Knowledgeable of meds, doses, and when last taken. Updated PTA medication list     ¹RxQuery pharmacy benefit data reflects medications filled and processed through the patient's insurance, however this data does NOT capture whether the medication was picked up or is currently being taken by the patient. Facility-Administered Medications:      Prior to Admission Medications   Prescriptions Last Dose Informant Taking? ALPRAZolam (XANAX) 0.5 mg tablet 2021 at am Self Yes   Sig: Take 0.5 mg by mouth three (3) times daily as needed for Anxiety. clomiPRAMINE (ANAFRANIL) 50 mg capsule 2021 at pm Self Yes   Sig: Take 100 mg by mouth nightly. levothyroxine (SYNTHROID) 200 mcg tablet 2021 at am Self Yes   Sig: Take 100 mcg by mouth Daily (before breakfast). pravastatin (PRAVACHOL) 40 mg tablet 2021 at am Self Yes   Sig: Take 40 mg by mouth daily. sertraline (ZOLOFT) 100 mg tablet 2021 at pm Self Yes   Sig: Take 200 mg by mouth nightly. Facility-Administered Medications: None        Please contact the main inpatient pharmacy with any questions or concerns at [phone] and we will direct you to the clinical pharmacist covering this patient's care while in-house. Poncho Price.

## 2021-05-19 NOTE — PROGRESS NOTES
Silverio Britton [facility] [address]  [address] [place] [address]  [phone]      Medical Progress Note      NAME: Aaron Shine   :  1955  MRM:  [id number]    Date/Time of service: 2021  11:23 AM       Subjective:     Chief Complaint:  Patient was personally seen and examined by me during this time period. Chart reviewed. C/o anxiety, left hip pain       Objective:       Vitals:       Last 24hrs VS reviewed since prior progress note.  Most recent are:    Visit Vitals  BP (!) 169/98 (BP 1 Location: Right upper arm, BP Patient Position: At rest)   Pulse (!) 104   Temp 98.8 °F (37.1 °C)   Resp 20   Ht 5' 8\" (1.727 m)   Wt 72.6 kg (160 lb 0.9 oz)   SpO2 91%   BMI 24.34 kg/m²     SpO2 Readings from Last 6 Encounters:   21 91%    O2 Flow Rate (L/min): 3 l/min       Intake/Output Summary (Last 24 hours) at 2021 1123  Last data filed at 2021 2533  Gross per 24 hour   Intake 0 ml   Output 400 ml   Net -400 ml        Exam:     Physical Exam:    Gen:  Elderly, frail, mild distress  HEENT:  Pink conjunctivae, PERRL, hearing intact to voice, moist mucous membranes  Neck:  Supple, without masses, thyroid non-tender  Resp:  No accessory muscle use, clear breath sounds without wheezes rales or rhonchi  Card:  3/6 murmurs, normal S1, S2 without thrills, bruits or peripheral edema  Abd:  Soft, non-tender, non-distended, normoactive bowel sounds are present  Musc:  No cyanosis or clubbing  Skin:  No rashes   Neuro:  Cranial nerves 3-12 are grossly intact, follows commands appropriately  Psych:  fair insight, oriented to person, place and time, alert    Medications Reviewed: (see below)    Lab Data Reviewed: (see below)    ______________________________________________________________________    Medications:     Current Facility-Administered Medications   Medication Dose Route Frequency    HYDROmorphone (DILAUDID) syringe 1 mg  1 mg IntraVENous Q4H PRN    dextrose 5% - 0.45% NaCl with KCl 20 mEq/L infusion  75 mL/hr IntraVENous CONTINUOUS    ALPRAZolam (XANAX) tablet 0.5 mg  0.5 mg Oral TID PRN    levothyroxine (SYNTHROID) tablet 100 mcg  100 mcg Oral ACB    pravastatin (PRAVACHOL) tablet 40 mg  40 mg Oral DAILY    sertraline (ZOLOFT) tablet 200 mg  200 mg Oral QHS    sodium chloride (NS) flush 5-40 mL  5-40 mL IntraVENous Q8H    sodium chloride (NS) flush 5-40 mL  5-40 mL IntraVENous PRN    acetaminophen (TYLENOL) tablet 650 mg  650 mg Oral Q6H PRN    Or    acetaminophen (TYLENOL) suppository 650 mg  650 mg Rectal Q6H PRN    polyethylene glycol (MIRALAX) packet 17 g  17 g Oral DAILY PRN    morphine injection 2 mg  2 mg IntraVENous Q4H PRN    morphine injection 2 mg  2 mg IntraVENous Q4H PRN    naloxone (NARCAN) injection 0.4 mg  0.4 mg IntraVENous EVERY 2 MINUTES AS NEEDED    amLODIPine (NORVASC) tablet 5 mg  5 mg Oral DAILY    pantoprazole (PROTONIX) 40 mg in 0.9% sodium chloride 10 mL injection  40 mg IntraVENous Q24H    labetaloL (NORMODYNE) tablet 100 mg  100 mg Oral Q12H    hydrALAZINE (APRESOLINE) 20 mg/mL injection 20 mg  20 mg IntraVENous Q6H PRN          Lab Review:     Recent Labs     05/19/21  0345 05/18/21  1428   WBC 12.7* 11.0   HGB 13.3 14.3   HCT 40.0 43.6    216     Recent Labs     05/19/21  0345 05/18/21  1428    138   K 3.4* 3.2*    105   CO2 28 27   * 115*   BUN 14 14   CREA 0.72 0.77   CA 8.2* 8.4*   MG 2.1 2.1   PHOS 2.7  --    ALB 3.6 4.2   TBILI 0.8 0.4   ALT 22 25   INR  --  1.0     No results found for: GLUCPOC       Assessment / Plan:     79 yo hx of HTN, hypothyroid, presented w/ uncontrolled HTN, L hip fx s/p fall    1) L hip fracture: due to mechanical fall. Cont IV dilaudid prn severe pain, lovenox for DVT prophy. Ortho to perform surgery    2) Pre-op eval/new severe aortic stenosis: echo with severe AS. Will need to monitor volume status closely post-op. Cards to eval    3) Malignant HTN urgency: BP is improving. Started oral norvasc, labetalol. Use IV hydralazine prn    4) Large hiatal hernia: seen on Xray. Cont PPI    5) Hypothyroid: TSH high. Unclear if patient is compliance on meds.   Cont synthroid for now    Total time spent with patient: 39 Minutes **I personally saw and examined the patient during this time period**                 Care Plan discussed with: Patient, nursing     Discussed:  Care Plan    Prophylaxis:  Lovenox    Disposition:  SNF/LTC           ___________________________________________________    Attending Physician: Stefan Jesus MD

## 2021-05-19 NOTE — OP NOTES
OPERATIVE REPORT     PREOPERATIVE DIAGNOSIS: Displaced left femoral neck fracture. POSTOPERATIVE DIAGNOSIS: Displaced left femoral neck fracture. PROCEDURE PERFORMED: Left hip hemiarthroplasty. SURGEON: Heather Momin. Shannon Castillo MD      Assistant: Chloe Pelayo PA-C    Explanation of Assistant: Assistant was needed for assistance with positioning, retraction, and maintenance of exposure during the surgery. ANESTHESIA: general anesthesia  . ESTIMATED BLOOD LOSS: 200mL. SPECIMENS REMOVED: Femoral head    FINDINGS: As above. COMPLICATIONS: None. IMPLANTS: [employer] ECHO system using a size 49bipolar head and a size 10 standard offset stem with a +3  neck. INDICATION FOR PROCEDURE:  The patient is a 78 y.o. female who sustained the above injury after a fall at home. She was brought to [facility] for further evaluation. The patient was seen in the emergency department by the orthopaedic PA and found to have the above injury. She was seen by the medicine service and cleared by cardiology and was optimized for surgical intervention and was deemed an appropriate surgical candidate  I saw the patient prior to surgery and discussed the risks, benefits, and alternatives to surgery. Particularly, we discussed the risks of bleeding, infection, damage to surrounding nerves and blood vessels, persistent pain, stiffness, and loss of function, dislocation, risk of subsequent fracture and risks of anesthesia. We also discussed the benefits of early mobilization and avoiding prolonged bedrest and also pain relief. The patient signed informed consent prior to proceeding. DESCRIPTION OF PROCEDURE: The patient was seen and identified in the preanesthesia care unit. The operative site was marked. Preoperative questions were invited and answered.  The patient was then evaluated by the anesthesia team. A spinal anesthetic was placed and he was brought to the operative suite on a stretcher, transferred to the operating table and was placed into the lateral decubitus position. All bony prominences were padded. An axillary roll was placed. The patient was then prepped and draped in the usual fashion. Ancef was given prior to incision. A timeout was entertained confirming the appropriate site, side, and procedure. Next, a standard posterolateral approach to the hip was entertained. The incision was in line with the femoral shaft distally and angled proximally towards the posterior superior iliac spine. Sharp dissection proceeded down the IT band, which was  from the overlying subcutaneous fat. This was divided in line with its fibers. This was split bluntly. The hip was internally rotated, exposing short external rotators. The piriformis was identified and tagged. This was removed from its insertion on the femur along with the short external rotators. Capsule was then visualized. This was then divided in a T fashion. The femoral neck cut was then made using the appropriate resection guide. This facilitated removal of the femoral head, which was removed with a corkscrew. The head was sized. At this point, the preparation of the femur was then performed first with a box gouge and then with the canal finder and then with sequential reaming by hand up to a size 10 followed bysequential broaching. Excellent fit was noted with a size 10 . A trial head using a standard offset -3 neck was then inserted and the hip was reduced and this was trialed. This was again trialed with a +3 neck that proved to have improved leg length. Excellent range of motion and stability was noted. At this point, the trial components were removed. The acetabulum was cleared of any debris. The wound was copiously irrigated and the final components were implanted and the hip was reduced. Excellent stability was again noted as well as appropriate leg length. At this point, the capsule was closed using #1 Vicryl suture.  The piriformis was closed in the same manner to the greater trochanter. The wound was irrigated again. The IT band was closed longitudinally with Stratafix with the hip in slight abduction. The subcutaneous tissues were closed with Vicryl, and the skin was closed with Monocryl and Dermabond. Sterile dressing was applied. The patient was then allowed to emerge from sedation and was brought to the PACU uneventfully. POSTOPERATIVE PLAN: Lovenox for 4 weeks. Posterior hip precautions. Progressive ambulation and discharge to home vs skilled nursing facility pending progress with therapy.

## 2021-05-19 NOTE — ANESTHESIA PREPROCEDURE EVALUATION
Relevant Problems   No relevant active problems       Anesthetic History   No history of anesthetic complications            Review of Systems / Medical History  Patient summary reviewed and pertinent labs reviewed    Pulmonary  Within defined limits                 Neuro/Psych         Psychiatric history     Cardiovascular    Hypertension  Valvular problems/murmurs: aortic stenosis              Comments: Severe AS per echo with preserved EF   GI/Hepatic/Renal           Hiatal hernia     Endo/Other      Hypothyroidism       Other Findings              Physical Exam    Airway  Mallampati: II  TM Distance: 4 - 6 cm  Neck ROM: normal range of motion   Mouth opening: Normal     Cardiovascular    Rhythm: regular  Rate: abnormal         Dental    Dentition: Poor dentition     Pulmonary  Breath sounds clear to auscultation               Abdominal  GI exam deferred       Other Findings            Anesthetic Plan    ASA: 3  Anesthesia type: general          Induction: Intravenous  Anesthetic plan and risks discussed with: Patient      Possible A-line.

## 2021-05-19 NOTE — PROGRESS NOTES
Physician Progress Note      Marek Ross  [facility] #:                  [id number]  :                       1955  ADMIT DATE:       2021 1:04 PM  [address] [place] DATE:  RESPONDING  PROVIDER #:        Frank Santos DO, MD          QUERY TEXT:    Dear Hospitalist Team,  Pt admitted with a left femur fracture after a GLF. Pt noted to have uncontrolled Hypertension with no history of antihypertensive medication use. If possible, please document in progress notes and discharge summary if you are evaluating and/or treating any of the following: The medical record reflects the following:    Risk Factors: 78 Yr F admitted for a Left Femur Fracture    Clinical Indicators: Patient arrives to the ED via EMS after a GLF at home. Work up in the ED revealed a acute left femur neck fracture. Vital signs obtained in the ED revealed BP of 207/125, 192/109. Patient continues with elevated BP of 202/121, 180/119, 237/202, etc. Patient is receiving Xanax 0.5 mg PO TID, Norvasc 5 mg PO daily, Hydralazine 20 mg IV Q 6 hrs and labetalol 100 mg PO Q 12 hrs. Patient received Lopressor 2.5 mg IV once and Labetalol 10 mg IV x's two doses. Treatment: Frequent vital signs/monitoring,  Xanax 0.5 mg PO TID, Norvasc 5 mg PO daily, Hydralazine 20 mg IV Q 6 hrs and labetalol 100 mg PO Q 12 hrs. Patient received Lopressor 2.5 mg IV once and Labetalol 10 mg IV x's two doses. Thank you,  Shawna Lindo RN, [facility], [address] [place] [address]    *Hypertensive Crisis, unspecified: at least 2 consecutive readings of SBP > 180 mmHg or DBP > 110 mmHg    *Hypertensive Urgency: Hypertensive crisis w/o associated organ dysfunction. S/s may or may not be present, but can include severe headache, SOB, epistaxis, severe anxiety. Tx: adjustment of oral antihypertensives; IV meds not usually required.     *Hypertensive Emergency: Hypertensive crisis w/ associated organ damage (stroke, encephalopathy, PREM, MI, angina, acute or decompensated CHF, acute pulmonary edema, HELLP, etc.). Requires immediate treatment (usually IV meds) & possible ICU admission. Associated organ dysfunction needs documented. [email or web address]  Options provided:  -- Hypertensive Crisis  -- Hypertensive Emergency  -- Hypertensive Urgency  -- Other - I will add my own diagnosis  -- Disagree - Not applicable / Not valid  -- Disagree - Clinically unable to determine / Unknown  -- Refer to Clinical Documentation Reviewer    PROVIDER RESPONSE TEXT:    This patient has hypertensive urgency. Query created by:  Hero Reaves on 5/19/2021 11:05 AM      Electronically signed by:  Clare Moise MD 5/19/2021 11:06 AM

## 2021-05-19 NOTE — ANESTHESIA POSTPROCEDURE EVALUATION
Procedure(s):  LEFT HIP HEMIARTHROPLASTY. general    Anesthesia Post Evaluation      Multimodal analgesia: multimodal analgesia not used between 6 hours prior to anesthesia start to PACU discharge  Patient location during evaluation: PACU  Patient participation: complete - patient participated  Level of consciousness: awake  Pain management: adequate  Airway patency: patent  Anesthetic complications: no  Cardiovascular status: acceptable, blood pressure returned to baseline and hemodynamically stable  Respiratory status: acceptable  Hydration status: acceptable  Post anesthesia nausea and vomiting:  controlled  Final Post Anesthesia Temperature Assessment:  Normothermia (36.0-37.5 degrees C)      INITIAL Post-op Vital signs:   Vitals Value Taken Time   BP 97/60 05/19/21 1945   Temp 36.4 °C (97.5 °F) 05/19/21 1933   Pulse 89 05/19/21 1947   Resp 13 05/19/21 1947   SpO2 97 % 05/19/21 1947   Vitals shown include unvalidated device data.

## 2021-05-19 NOTE — PROGRESS NOTES
Bedside and Verbal shift change report given to Dalia (oncoming nurse) by Doretha Vargas (offgoing nurse). Report included the following information SBAR, Kardex, ED Summary, Intake/Output, MAR, Recent Results and Cardiac Rhythm sinus .

## 2021-05-20 ENCOUNTER — APPOINTMENT (OUTPATIENT)
Dept: GENERAL RADIOLOGY | Age: 66
DRG: 522 | End: 2021-05-20
Attending: NURSE PRACTITIONER
Payer: MEDICARE

## 2021-05-20 LAB
ANION GAP SERPL CALC-SCNC: 6 MMOL/L (ref 5–15)
BASOPHILS # BLD: 0 K/UL (ref 0–0.1)
BASOPHILS NFR BLD: 0 % (ref 0–1)
BUN SERPL-MCNC: 27 MG/DL (ref 6–20)
BUN/CREAT SERPL: 25 (ref 12–20)
CALCIUM SERPL-MCNC: 7.9 MG/DL (ref 8.5–10.1)
CHLORIDE SERPL-SCNC: 109 MMOL/L (ref 97–108)
CO2 SERPL-SCNC: 25 MMOL/L (ref 21–32)
CREAT SERPL-MCNC: 1.06 MG/DL (ref 0.55–1.02)
DIFFERENTIAL METHOD BLD: ABNORMAL
EOSINOPHIL # BLD: 0 K/UL (ref 0–0.4)
EOSINOPHIL NFR BLD: 0 % (ref 0–7)
ERYTHROCYTE [DISTWIDTH] IN BLOOD BY AUTOMATED COUNT: 13.7 % (ref 11.5–14.5)
GLUCOSE SERPL-MCNC: 107 MG/DL (ref 65–100)
HCT VFR BLD AUTO: 31.8 % (ref 35–47)
HGB BLD-MCNC: 10.6 G/DL (ref 11.5–16)
IMM GRANULOCYTES # BLD AUTO: 0 K/UL (ref 0–0.04)
IMM GRANULOCYTES NFR BLD AUTO: 0 % (ref 0–0.5)
LYMPHOCYTES # BLD: 1 K/UL (ref 0.8–3.5)
LYMPHOCYTES NFR BLD: 9 % (ref 12–49)
MAGNESIUM SERPL-MCNC: 2 MG/DL (ref 1.6–2.4)
MCH RBC QN AUTO: 31 PG (ref 26–34)
MCHC RBC AUTO-ENTMCNC: 33.3 G/DL (ref 30–36.5)
MCV RBC AUTO: 93 FL (ref 80–99)
MONOCYTES # BLD: 0.9 K/UL (ref 0–1)
MONOCYTES NFR BLD: 9 % (ref 5–13)
NEUTS SEG # BLD: 8.5 K/UL (ref 1.8–8)
NEUTS SEG NFR BLD: 81 % (ref 32–75)
NRBC # BLD: 0 K/UL (ref 0–0.01)
NRBC BLD-RTO: 0 PER 100 WBC
PHOSPHATE SERPL-MCNC: 3 MG/DL (ref 2.6–4.7)
PLATELET # BLD AUTO: 177 K/UL (ref 150–400)
PMV BLD AUTO: 11 FL (ref 8.9–12.9)
POTASSIUM SERPL-SCNC: 4 MMOL/L (ref 3.5–5.1)
RBC # BLD AUTO: 3.42 M/UL (ref 3.8–5.2)
SARS-COV-2, COV2: NORMAL
SODIUM SERPL-SCNC: 140 MMOL/L (ref 136–145)
WBC # BLD AUTO: 10.5 K/UL (ref 3.6–11)

## 2021-05-20 PROCEDURE — 80048 BASIC METABOLIC PNL TOTAL CA: CPT

## 2021-05-20 PROCEDURE — 74011250637 HC RX REV CODE- 250/637: Performed by: PHYSICIAN ASSISTANT

## 2021-05-20 PROCEDURE — C9113 INJ PANTOPRAZOLE SODIUM, VIA: HCPCS | Performed by: PHYSICIAN ASSISTANT

## 2021-05-20 PROCEDURE — 84100 ASSAY OF PHOSPHORUS: CPT

## 2021-05-20 PROCEDURE — 71045 X-RAY EXAM CHEST 1 VIEW: CPT

## 2021-05-20 PROCEDURE — 99232 SBSQ HOSP IP/OBS MODERATE 35: CPT | Performed by: INTERNAL MEDICINE

## 2021-05-20 PROCEDURE — 74011250636 HC RX REV CODE- 250/636: Performed by: PHYSICIAN ASSISTANT

## 2021-05-20 PROCEDURE — 97165 OT EVAL LOW COMPLEX 30 MIN: CPT | Performed by: OCCUPATIONAL THERAPIST

## 2021-05-20 PROCEDURE — 97530 THERAPEUTIC ACTIVITIES: CPT

## 2021-05-20 PROCEDURE — 97535 SELF CARE MNGMENT TRAINING: CPT | Performed by: OCCUPATIONAL THERAPIST

## 2021-05-20 PROCEDURE — 83735 ASSAY OF MAGNESIUM: CPT

## 2021-05-20 PROCEDURE — 74011000250 HC RX REV CODE- 250: Performed by: PHYSICIAN ASSISTANT

## 2021-05-20 PROCEDURE — 97162 PT EVAL MOD COMPLEX 30 MIN: CPT

## 2021-05-20 PROCEDURE — 65660000000 HC RM CCU STEPDOWN

## 2021-05-20 PROCEDURE — 85025 COMPLETE CBC W/AUTO DIFF WBC: CPT

## 2021-05-20 PROCEDURE — U0005 INFEC AGEN DETEC AMPLI PROBE: HCPCS

## 2021-05-20 PROCEDURE — 74011250637 HC RX REV CODE- 250/637: Performed by: INTERNAL MEDICINE

## 2021-05-20 PROCEDURE — 36415 COLL VENOUS BLD VENIPUNCTURE: CPT

## 2021-05-20 PROCEDURE — 74011250636 HC RX REV CODE- 250/636: Performed by: NURSE PRACTITIONER

## 2021-05-20 RX ORDER — METOPROLOL TARTRATE 25 MG/1
25 TABLET, FILM COATED ORAL EVERY 12 HOURS
Status: DISCONTINUED | OUTPATIENT
Start: 2021-05-20 | End: 2021-05-21

## 2021-05-20 RX ORDER — FUROSEMIDE 10 MG/ML
40 INJECTION INTRAMUSCULAR; INTRAVENOUS ONCE
Status: COMPLETED | OUTPATIENT
Start: 2021-05-20 | End: 2021-05-20

## 2021-05-20 RX ADMIN — DOCUSATE SODIUM 50MG AND SENNOSIDES 8.6MG 1 TABLET: 8.6; 5 TABLET, FILM COATED ORAL at 08:13

## 2021-05-20 RX ADMIN — PANTOPRAZOLE SODIUM 40 MG: 40 INJECTION, POWDER, FOR SOLUTION INTRAVENOUS at 20:19

## 2021-05-20 RX ADMIN — CEFAZOLIN 2 G: 1 INJECTION, POWDER, FOR SOLUTION INTRAMUSCULAR; INTRAVENOUS at 05:05

## 2021-05-20 RX ADMIN — Medication 1 TABLET: at 11:19

## 2021-05-20 RX ADMIN — Medication 1 TABLET: at 08:13

## 2021-05-20 RX ADMIN — ACETAMINOPHEN 650 MG: 325 TABLET ORAL at 17:14

## 2021-05-20 RX ADMIN — METOPROLOL TARTRATE 25 MG: 25 TABLET, FILM COATED ORAL at 21:49

## 2021-05-20 RX ADMIN — LEVOTHYROXINE SODIUM 100 MCG: 0.1 TABLET ORAL at 08:13

## 2021-05-20 RX ADMIN — AMLODIPINE BESYLATE 5 MG: 5 TABLET ORAL at 21:49

## 2021-05-20 RX ADMIN — FUROSEMIDE 40 MG: 10 INJECTION, SOLUTION INTRAMUSCULAR; INTRAVENOUS at 15:02

## 2021-05-20 RX ADMIN — Medication 10 ML: at 21:49

## 2021-05-20 RX ADMIN — Medication 10 ML: at 13:52

## 2021-05-20 RX ADMIN — DOCUSATE SODIUM 50MG AND SENNOSIDES 8.6MG 1 TABLET: 8.6; 5 TABLET, FILM COATED ORAL at 17:14

## 2021-05-20 RX ADMIN — Medication 10 ML: at 05:05

## 2021-05-20 RX ADMIN — PRAVASTATIN SODIUM 40 MG: 20 TABLET ORAL at 08:13

## 2021-05-20 RX ADMIN — Medication 1 TABLET: at 17:14

## 2021-05-20 RX ADMIN — OXYCODONE 5 MG: 5 TABLET ORAL at 04:53

## 2021-05-20 RX ADMIN — LABETALOL HYDROCHLORIDE 100 MG: 100 TABLET, FILM COATED ORAL at 10:30

## 2021-05-20 RX ADMIN — ACETAMINOPHEN 650 MG: 325 TABLET ORAL at 11:19

## 2021-05-20 RX ADMIN — POLYETHYLENE GLYCOL 3350 17 G: 17 POWDER, FOR SOLUTION ORAL at 08:13

## 2021-05-20 RX ADMIN — ENOXAPARIN SODIUM 40 MG: 40 INJECTION SUBCUTANEOUS at 08:13

## 2021-05-20 RX ADMIN — CEFAZOLIN 2 G: 1 INJECTION, POWDER, FOR SOLUTION INTRAMUSCULAR; INTRAVENOUS at 13:48

## 2021-05-20 RX ADMIN — SODIUM CHLORIDE 125 ML/HR: 9 INJECTION, SOLUTION INTRAVENOUS at 04:41

## 2021-05-20 RX ADMIN — ACETAMINOPHEN 650 MG: 325 TABLET ORAL at 00:50

## 2021-05-20 RX ADMIN — ACETAMINOPHEN 650 MG: 325 TABLET ORAL at 05:04

## 2021-05-20 RX ADMIN — SERTRALINE HYDROCHLORIDE 200 MG: 50 TABLET, FILM COATED ORAL at 21:49

## 2021-05-20 RX ADMIN — OXYCODONE 5 MG: 5 TABLET ORAL at 21:52

## 2021-05-20 NOTE — PROGRESS NOTES
Received to [facility][id number] postop. Confused, restless, but calms easily with emotional support and reorientation. Denies any pain. Placed on monitors. NSR, MAP > 65mm/hg. L Hip dsg d/i. Leg immobilizer in place. SCDS. Salazar to SD, urine output qs, hazy yellow. O2 4l/M NC continued from PACU. Sat 89-91% with RR 20/m. See assessment.

## 2021-05-20 NOTE — PROGRESS NOTES
Orthopaedic Progress Note  Post Op day: 1 Day Post-Op    May 20, 2021 12:20 PM     Patient: Bala Marie MRN: [id number]  SSN: [id number]    YOB: 1955  Age: 78 y.o. Sex: female      Admit date:  2021  Date of Surgery:  2021   Procedures:  Procedure(s):  LEFT HIP HEMIARTHROPLASTY  Admitting Physician:  Traci Collet, MD   Surgeon:  Debora Rasmussen) and Role:     * Valerie Britton MD - Primary    Consulting Physician(s): Treatment Team: Attending Provider: Geri Rich MD; Care Manager: Kailash Alvarez; Consulting Provider: Geri Rich MD; Consulting Provider: Mimi Grant MD; Consulting Provider: Valerie Britton MD; Consulting Provider: Cony Reed; Utilization Review: Vipin Jeffries; Care Manager: Jose MERCER    SUBJECTIVE:     Bala Marie is a 78 y.o. female is 1 Day Post-Op s/p Procedure(s):  LEFT HIP HEMIARTHROPLASTY with an appropriate level of post-operative pain. She was sleeping when I came in. She was easily awakened. Denies worsening left hip pain. Knee immobilizer in place. No complaints of nausea, vomiting, dizziness, lightheadedness, chest pain, or shortness of breath. OBJECTIVE:       Physical Exam:  General: Alert, cooperative, no distress. Respiratory: Respirations unlabored  Neurological:  Neurovascular exam within normal limits. Motor: + DF/PF. Musculoskeletal: Calves soft, supple, non-tender upon palpation. Dressing/Wound:  Clean, dry and intact. No significant erythema or swelling.       Vital Signs:        Patient Vitals for the past 8 hrs:   BP Temp Pulse Resp SpO2   21 1130 121/89 98.8 °F (37.1 °C) (!) 110 20 93 %   21 1030 125/88  (!) 104 20 (!) 84 %   21 0730 115/69 98.2 °F (36.8 °C) 98  96 %   21 0700   98     21 0452 126/69 98.4 °F (36.9 °C) (!) 103 20 94 %                                          Temp (24hrs), Av °F (36.7 °C), Min:97.2 °F (36.2 °C), Max:98.8 °F (37.1 °C)      Labs:        Recent Labs     05/20/21  0501 05/18/21  1428   HCT 31.8* 43.6   HGB 10.6* 14.3   INR  --  1.0     Lab Results   Component Value Date/Time    Sodium 138 05/19/2021 03:45 AM    Potassium 3.4 (L) 05/19/2021 03:45 AM    Chloride 105 05/19/2021 03:45 AM    CO2 28 05/19/2021 03:45 AM    Glucose 122 (H) 05/19/2021 03:45 AM    BUN 14 05/19/2021 03:45 AM    Creatinine 0.72 05/19/2021 03:45 AM    Calcium 8.2 (L) 05/19/2021 03:45 AM       PT/OT:                Patient mobility  Bed Mobility Training  Supine to Sit: Maximum assistance, Additional time, Assist x1  Sit to Supine: Maximum assistance, Additional time, Assist x2  Scooting: Maximum assistance, Additional time, Assist x1  Transfer Training  Sit to Stand: Moderate assistance, Additional time, Assist x2  Stand to Sit: Moderate assistance, Additional time, Assist x2 (heavy posterior wt shift requiring max A for forward wt shif)  Bed to Chair: Total assistance (unsafe)                   ASSESSMENT / PLAN:   Active Problems:    Hypothyroidism ()      Hernia, hiatal ()      HTN (hypertension) ()      HLD (hyperlipidemia) ()      Hip fracture ([facility] [address] (5/18/2021)            A: 1. S/P left hip hemiarthroplasty POD 1    P: 1. PT/OT: WBAT with therapy. Knee immobilizer while in bed for posterior hip precautions. Ok to remove for therapy when ambulating. Avoid extremes of hip motion. Will remove knee immobilizer following hospitalization. 2. DVT ppx: Lovenox 40 mg SC daily for 1 month. 3. Pain: Roxicodone 2.5 mg  4.  DISPO: SNF      Signed By:  Ulysses [employer], [facility]

## 2021-05-20 NOTE — PROGRESS NOTES
Shift Change:      Bedside and Verbal shift change report given to [address] Bleu (oncoming nurse) by [address] [facility] (offgoing nurse). Report included the following information SBAR, Kardex and Recent Results. Shift Summary: This patient was assisted with Intentional Toileting every 2 hours during this shift. Documentation of ambulation and output reflected on Flowsheet. 1327: mosqueda catheter removed. Patient voided 200mL with purewick. Shift Change:      Bedside and Verbal shift change report given to ALBINO Carmona (oncoming nurse) by [address] Blue (offgoing nurse). Report included the following information SBAR, Kardex and Recent Results.

## 2021-05-20 NOTE — PROGRESS NOTES
Problem: Mobility Impaired (Adult and Pediatric)  Goal: *Acute Goals and Plan of Care (Insert Text)  Description:        Outcome: Progressing Towards Goal  Note: FUNCTIONAL STATUS PRIOR TO ADMISSION: Patient was independent and active without use of DME.    HOME SUPPORT PRIOR TO ADMISSION: The patient lived alone with no local support. Physical Therapy Goals  Initiated 5/20/2021  1. Patient will move from supine to sit and sit to supine  in bed with minimal assistance/contact guard assist within 7 day(s). 2.  Patient will transfer from bed to chair and chair to bed with minimal assistance/contact guard assist using the least restrictive device within 7 day(s). 3.  Patient will perform sit to stand with minimal assistance/contact guard assist within 7 day(s). 4.  Patient will ambulate with minimal assistance/contact guard assist for 50 feet with the least restrictive device within 7 day(s). PHYSICAL THERAPY EVALUATION  Patient: Kelly Izaguirre (31 y.o. female)  Date: 5/20/2021  Primary Diagnosis: Hip fracture ([facility] [address] [S72.009A]  Procedure(s) (LRB):  LEFT HIP HEMIARTHROPLASTY (Left) 1 Day Post-Op   Precautions:   Fall, WBAT, Total hip (posterior,knee immobilizer in bed for proper positioning LLE)      ASSESSMENT  Based on the objective data described below, the patient presents with impaired cognition (oriented x2), decreased strength, ROM, and ability to participate following admission for fall with resulting hip fracture and is now POD #1 Left hemiarthroplasty. Patient today has increased resistance to move with assessment in bed, she has poor command following. Nursing reports she was alert and oriented x4 prior to surgery. Patient with drastic change in overall functional mobility and will require rehab at discharge. Current Level of Function Impacting Discharge (mobility/balance): Total    Functional Outcome Measure:   The patient scored Total: 15/100 on the Barthel Index which is indicative of 85% impaired ability to care for basic self needs/dependency on others. Other factors to consider for discharge:      Patient will benefit from skilled therapy intervention to address the above noted impairments. PLAN :  Recommendations and Planned Interventions: bed mobility training, transfer training, gait training, therapeutic exercises, and therapeutic activities      Frequency/Duration: Patient will be followed by physical therapy:  twice daily to address goals. Recommendation for discharge: (in order for the patient to meet his/her long term goals)  Therapy up to 5 days/week in rehab setting    This discharge recommendation:  A follow-up discussion with the attending provider and/or case management is planned    IF patient discharges home will need the following DME: to be determined (TBD)         SUBJECTIVE:   Patient stated : patient is able to vocalize she is confused    OBJECTIVE DATA SUMMARY:   HISTORY:    Past Medical History:   Diagnosis Date    Hernia, hiatal     Hypothyroidism     OCD (obsessive compulsive disorder)    History reviewed. No pertinent surgical history.     Personal factors and/or comorbidities impacting plan of care: see above    Home Situation  Home Environment: Private residence ([address] [place] [address],[address])  # Steps to Enter: 0  One/Two Story Residence: Two story  # of Interior Steps: 15  Interior Rails: Left  Living Alone: Yes  Support Systems: Family member(s), Friends \ neighbors  Patient Expects to be Discharged to[de-identified] Private residence  Current DME Used/Available at Home: Puma Mohs, rolling, Cane, straight  Tub or Shower Type: Tub/Shower combination    EXAMINATION/PRESENTATION/DECISION MAKING:   Critical Behavior:  Neurologic State: Drowsy, Confused  Orientation Level: Oriented to person, Oriented to place, Oriented to situation, Disoriented to time  Cognition: Decreased attention/concentration, Decreased command following, Poor safety awareness  Safety/Judgement: Awareness of environment, Decreased awareness of need for assistance, Decreased awareness of need for safety, Decreased insight into deficits, Fall prevention  Hearing: Auditory  Auditory Impairment: None  Skin:  knee immobilizer to left LE, removed for assessment all skin intact  Edema: none noted  Range Of Motion:  AROM: Generally decreased, functional (increased tone and resistance LLE with mobility)           PROM: Generally decreased, functional           Strength:    Strength: Generally decreased, functional                    Tone & Sensation:   Tone: Abnormal (increased tone and resistance LLE with mobility)                              Coordination:  Coordination: Generally decreased, functional  Vision:   Acuity: Able to read clock/calendar on wall without difficulty  Functional Mobility:  Bed Mobility:     Supine to Sit: Maximum assistance; Additional time;Assist x1  Sit to Supine: Maximum assistance; Additional time;Assist x2  Scooting: Maximum assistance; Additional time;Assist x1  Transfers:  Sit to Stand: Moderate assistance; Additional time;Assist x2  Stand to Sit: Moderate assistance; Additional time;Assist x2 (heavy posterior wt shift requiring max A for forward wt shif)        Bed to Chair: Total assistance (unsafe)              Balance:   Sitting: Impaired; With support (LOB posteriolry without warning in sitting)  Sitting - Static: Poor (constant support)  Sitting - Dynamic: Poor (constant support)  Standing: Impaired;Pull to stand; With support (RW)  Standing - Static: Poor;Constant support  Standing - Dynamic : Poor;Constant support  Ambulation/Gait Training:                                           Functional Measure:  Barthel Index:    Bathin  Bladder: 0 (mosqueda)  Bowels: 10  Groomin  Dressin  Feedin  Mobility: 0  Stairs: 0  Toilet Use: 0  Transfer (Bed to Chair and Back): 5  Total: 15/100       The Barthel ADL Index: Guidelines  1.  The index should be used as a record of what a patient does, not as a record of what a patient could do. 2. The main aim is to establish degree of independence from any help, physical or verbal, however minor and for whatever reason. 3. The need for supervision renders the patient not independent. 4. A patient's performance should be established using the best available evidence. Asking the patient, friends/relatives and nurses are the usual sources, but direct observation and common sense are also important. However direct testing is not needed. 5. Usually the patient's performance over the preceding 24-48 hours is important, but occasionally longer periods will be relevant. 6. Middle categories imply that the patient supplies over 50 per cent of the effort. 7. Use of aids to be independent is allowed. Alla Litter., Barthel, DAshleyW. (2160). Functional evaluation: the Barthel Index. [address] [facility] (14)2. BERTO Bazan, Shaniqua Patrick, Yola Obrien, [place], [address] [facility] (1999). Measuring the change indisability after inpatient rehabilitation; comparison of the responsiveness of the Barthel Index and Functional [place] Measure. Journal of Neurology, Neurosurgery, and Psychiatry, 66(4), 280-962. Hellen Caldwell, N.J.A, MICHELLE Kan.SARAH, & Deann Boast, MJEAN. (2004.) Assessment of post-stroke quality of life in cost-effectiveness studies: The usefulness of the Barthel Index and the EuroQoL-5D.  Quality of Life Research, 15, 121-07        Physical Therapy Evaluation Charge Determination   History Examination Presentation Decision-Making   HIGH Complexity :3+ comorbidities / personal factors will impact the outcome/ POC  HIGH Complexity : 4+ Standardized tests and measures addressing body structure, function, activity limitation and / or participation in recreation  MEDIUM Complexity : Evolving with changing characteristics  Other outcome measures barthel index  HIGH       Based on the above components, the patient evaluation is determined to be of the following complexity level: MEDIUM    Pain Rating:  premedicated    Activity Tolerance:   Fair and Poor    After treatment patient left in no apparent distress:   Supine in bed, Call bell within reach, and Bed / chair alarm activated    COMMUNICATION/EDUCATION:   The patients plan of care was discussed with: Registered nurse. Fall prevention education was provided and the patient/caregiver indicated understanding., Patient/family have participated as able in goal setting and plan of care. , and Patient/family agree to work toward stated goals and plan of care.     Thank you for this referral.  Mihir Fernandez, PT, DPT   Time Calculation: 22 mins

## 2021-05-20 NOTE — PROGRESS NOTES
Problem: Self Care Deficits Care Plan (Adult)  Goal: *Acute Goals and Plan of Care (Insert Text)  Description:   FUNCTIONAL STATUS PRIOR TO ADMISSION: Patient was modified independent for functional mobility. HOME SUPPORT: The patient lived alone with family local.    Occupational Therapy Goals  Initiated 5/20/2021  1. Patient will perform grooming and upper body dressing seated EOB with supervision/set-up within 7 day(s). 2.  Patient will perform lower body dressing with moderate assistance using AE following her posterior hip precautions within 7 day(s). 3.  Patient will perform toilet transfers with minimal assistance/contact guard assist within 7 day(s). 4.  Patient will perform all aspects of toileting with minimal assistance/contact guard assist within 7 day(s). 5.  Patient will participate in upper extremity therapeutic exercise/activities with supervision/set-up for 10 minutes within 7 day(s). 6.  Patient will utilize energy conservation techniques during functional activities with verbal and visual cues within 7 day(s). Outcome: Progressing Towards Goal     OCCUPATIONAL THERAPY EVALUATION  Patient: Brittney Ca (72 y.o. female)  Date: 5/20/2021  Primary Diagnosis: Hip fracture ([facility] [address] [S72.009A]  Procedure(s) (LRB):  LEFT HIP HEMIARTHROPLASTY (Left) 1 Day Post-Op   Precautions: Fall, WBAT, Total hip (posterior,knee immobilizer in bed for proper positioning LLE)    ASSESSMENT  Pt cleared for OT by RN. Order noted for knee immobilizer in bed for positioning. Based on the objective data described below, the patient presents with impaired cognition, command following and attention to task, decreased strength, endurance, mobility, balance in sitting with LOB without warning and standing and safety following fall at home while letting her cat outside and now POD 1 L total hip hemiarthroplasty with posterior hip precautions.   She requires significant cognitive and physical assist with ADLs and functional mobility. This is far from her independent baseline living alone. Recommend IP rehab at discharge. Pt on 4L O2 in bed with sats 97%. O2 sats on RA 85% with EOB sitting, on 2L 88% and on 4L 93%. Current Level of Function Impacting Discharge (ADLs/self-care): total A LE ADLs and toileting, max A x2 bed mobility and mod A x2 to stand, but unable to step    Functional Outcome Measure: The patient scored 15/100 on the Barthel Index outcome measure. Other factors to consider for discharge: see above     Patient will benefit from skilled therapy intervention to address the above noted impairments. PLAN :  Recommendations and Planned Interventions: self care training, functional mobility training, therapeutic exercise, balance training, therapeutic activities, cognitive retraining, endurance activities, neuromuscular re-education, patient education, home safety training, and family training/education    Frequency/Duration: Patient will be followed by occupational therapy 5 times a week to address goals. Recommendation for discharge: (in order for the patient to meet his/her long term goals)  Therapy 3 hours per day 5-7 days per week    This discharge recommendation:  Has been made in collaboration with the attending provider and/or case management    IF patient discharges home will need the following DME: TBD       SUBJECTIVE:   Patient stated I don't know why I can't keep my balance.     OBJECTIVE DATA SUMMARY:   HISTORY:   Past Medical History:   Diagnosis Date    Hernia, hiatal     Hypothyroidism     OCD (obsessive compulsive disorder)    History reviewed. No pertinent surgical history.     Expanded or extensive additional review of patient history:     Home Situation  Home Environment: Private residence ([facility])  # Steps to Enter: 0  One/Two Story Residence: Two story  # of Interior Steps: 14  Interior Rails: Left  Living Alone: Yes  Support Systems: Family member(s), Friends \ neighbors  Patient Expects to be Discharged to[de-identified] Private residence  Current DME Used/Available at Home: Veleria Graham, rolling, Cane, straight  Tub or Shower Type: Tub/Shower combination    Hand dominance: Right    EXAMINATION OF PERFORMANCE DEFICITS:  Cognitive/Behavioral Status:  Neurologic State: Drowsy; Confused  Orientation Level: Oriented to person;Oriented to place;Oriented to situation;Disoriented to time  Cognition: Decreased attention/concentration;Decreased command following;Poor safety awareness  Perception: Cues to maintain midline in sitting;Cues to maintain midline in standing; Tactile;Verbal;Visual (LOB posteriolry without warning in sitting)  Perseveration: No perseveration noted  Safety/Judgement: Awareness of environment;Decreased awareness of need for assistance;Decreased awareness of need for safety;Decreased insight into deficits; Fall prevention    Hearing: Auditory  Auditory Impairment: None    Vision/Perceptual:    Acuity: Able to read clock/calendar on wall without difficulty         Range of Motion:  AROM: Generally decreased, functional (increased tone and resistance LLE with mobility)  PROM: Generally decreased, functional                      Strength:  Strength: Generally decreased, functional                Coordination:  Coordination: Generally decreased, functional  Fine Motor Skills-Upper: Left Impaired;Right Impaired    Gross Motor Skills-Upper: Left Intact; Right Intact    Tone & Sensation:  Tone: Abnormal (increased tone and resistance LLE with mobility)                         Balance:  Sitting: Impaired; With support (LOB posteriolry without warning in sitting)  Sitting - Static: Poor (constant support)  Sitting - Dynamic: Poor (constant support)  Standing: Impaired;Pull to stand; With support (RW)  Standing - Static: Poor;Constant support  Standing - Dynamic : Poor;Constant support    Functional Mobility and Transfers for ADLs:  Bed Mobility:  Supine to Sit: Maximum assistance; Additional time;Assist x1  Sit to Supine: Maximum assistance; Additional time;Assist x2  Scooting: Maximum assistance; Additional time;Assist x1    Transfers:  Sit to Stand: Moderate assistance; Additional time;Assist x2  Stand to Sit: Moderate assistance; Additional time;Assist x2 (heavy posterior wt shift requiring max A for forward wt shif)  Bed to Chair: Total assistance (unsafe)  Toilet Transfer : Total assistance (unsafe to [facility])  Assistive Device : Gait Belt;Walker, rolling    ADL Assessment:  Patient recalled and demonstrated avoiding extreme planes of movement with Left LE during ADLs and functional mobility with max cues. Feeding: Minimum assistance (due to drowsiness and decreased attention to task)    Oral Facial Hygiene/Grooming: Maximum assistance (seated EOB due to poor balance)    Bathing: Maximum assistance; Additional time;Assist x1 (bed level due to decreased attention to task)    Upper Body Dressing: Maximum assistance; Additional time;Assist x1 (seated EOB due to poor balance)    Lower Body Dressing: Total assistance    Toileting: Total assistance (folwy)                ADL Intervention and task modifications:  Cognitive Retraining  Safety/Judgement: Awareness of environment;Decreased awareness of need for assistance;Decreased awareness of need for safety;Decreased insight into deficits; Fall prevention    Bathing: Patient instructed when bathing to not submerge wound in water, stand to shower or sponge bathe, cover wound with plastic and tape to ensure no water reaches bandage/wound without cues. Patient indicated understanding. Dressing joint: Patient instructed and demonstrated to don/doff Left LE first/last max cues. Patient instructed and demonstrated to don all clothing while sitting prior to standing, doff all clothing to knees while standing, then sit to doff clothing off from knees to feet in order to facilitate fall prevention, pain management, and energy conservation with Total assistance.     Home safety: Patient instructed on home modifications and safety (raise height of ADL objects, appropriate height of chair surfaces, recliner safety, change of floor surfaces, clear pathways) to increase independence and fall prevention. Patient indicated understanding. Standing: Patient instructed and demonstrated to walk up to sink/counter top/surfaces, step into walker to increase safety of joint and fall prevention with Total assistance. Instructed to apply concept of hip contraindications to ADLs within the home (no extreme reaching across body to Left side, square off while using objects, slide objects along surfaces). Patient instructed to increase amount of time standing, observe standing position during ADLs in order to increase even weight bearing through bilateral LEs in order to increase independence with ADLs. Goal to be reached 30 days post - op, per orthopedic surgeon or per PT. Patient indicated understanding. Tub transfer: Patient instructed regarding when it is safe to begin transfer into tub (complete stairs with PT, advance exercises with PT high enough to clear tub height). Patient instructed to use the same technique as used with stairs when entering and exiting tub (\"up with the non-surgical, down with the surgical leg\"). Patient indicated understanding. Functional Measure:  Barthel Index:    Bathin  Bladder: 0 (mosqueda)  Bowels: 10  Groomin  Dressin  Feedin  Mobility: 0  Stairs: 0  Toilet Use: 0  Transfer (Bed to Chair and Back): 5  Total: 15/100        The Barthel ADL Index: Guidelines  1. The index should be used as a record of what a patient does, not as a record of what a patient could do. 2. The main aim is to establish degree of independence from any help, physical or verbal, however minor and for whatever reason. 3. The need for supervision renders the patient not independent. 4. A patient's performance should be established using the best available evidence. Asking the patient, friends/relatives and nurses are the usual sources, but direct observation and common sense are also important. However direct testing is not needed. 5. Usually the patient's performance over the preceding 24-48 hours is important, but occasionally longer periods will be relevant. 6. Middle categories imply that the patient supplies over 50 per cent of the effort. 7. Use of aids to be independent is allowed. Ebenezer Tay., Barthel, D.W. (9940). Functional evaluation: the Barthel Index. [address] [facility] (14)2. [place] annette BERTO Rodriguez, Kami Yi., [place]., [place], [address] (1999). Measuring the change indisability after inpatient rehabilitation; comparison of the responsiveness of the Barthel Index and Functional [place] Measure. Journal of Neurology, Neurosurgery, and Psychiatry, 66(4), 070-272. Ricco Singh, N.J.A, JASON Kan, & Princess Francois, M.A. (2004.) Assessment of post-stroke quality of life in cost-effectiveness studies: The usefulness of the Barthel Index and the EuroQoL-5D. Quality of Life Research, 15, 115-67        Occupational Therapy Evaluation Charge Determination   History Examination Decision-Making   LOW Complexity : Brief history review  HIGH Complexity : 5 or more performance deficits relating to physical, cognitive , or psychosocial skils that result in activity limitations and / or participation restrictions MEDIUM Complexity : Patient may present with comorbidities that affect occupational performnce.  Miniml to moderate modification of tasks or assistance (eg, physical or verbal ) with assesment(s) is necessary to enable patient to complete evaluation       Based on the above components, the patient evaluation is determined to be of the following complexity level: LOW   Pain Rating:  C/o LLE pain with mobility but unable to give a number    Activity Tolerance:   Fair and requires frequent rest breaks  Please refer to the flowsheet for vital signs taken during this treatment. After treatment patient left in no apparent distress:    Supine in bed, Call bell within reach, Bed / chair alarm activated, and Caregiver / family present    COMMUNICATION/EDUCATION:   The patients plan of care was discussed with: Registered nurse. Patient is unable to participate in goal setting and plan of care. This patients plan of care is appropriate for delegation to [facility].     Thank you for this referral.  Argentina Rahman OT  Time Calculation: 41 mins

## 2021-05-20 NOTE — PROGRESS NOTES
Cardiology Progress Note                              [address], [place], [address] [facility]                                 Phone [phone]; Fax [phone]        2021 9:57 AM     Admit Date:           2021  Admit Diagnosis:  Hip fracture ([address] Shaniqua Cummings  :          1955   MRN:          [id number]       Impression Plan/Recommendation   Left hip fracture s/p Left hip hemiarthroplasty POD#1  Hypertension   Severe AS               Echo showing pEF w severe AS  BP controlled- continue BB/CCB  Decrease IVF rate  Desat to 85% w/ O2 off- check xray  Needs incentive spirometer         Addendum- chest xray shows central congestion  D/c IVF- give 40 mg IV lasix now     Pt personally seen and examined. Chart reviewed. Agree with advanced NP's history, exam and  A/P with changes/additons. Blood pressure 121/89, pulse (!) 110, temperature 98.8 °F (37.1 °C), resp. rate 20, height 5' 7.99\" (1.727 m), weight 160 lb (72.6 kg), SpO2 93 %. A/P -S/p ORIF L Hip fracture           Severe AS - monitor volume status closely    Discussed with patient/nursing    Valorie Donato MD, [facility]          No intake/output data recorded. Last 3 Recorded Weights in this Encounter    21 1320 21 0738 21 1426   Weight: 160 lb (72.6 kg) 160 lb 0.9 oz (72.6 kg) 160 lb (72.6 kg)          1901 -  0700  In: 2399.6 [P.O.:160; I.V.:2239.6]  Out: 1500 [Urine:1300]    SUBJECTIVE               Evalina Cluck denies palpitations, irregular heart beat, SOB, chest pain or LE edema.           Current Facility-Administered Medications   Medication Dose Route Frequency    ALPRAZolam (XANAX) tablet 0.5 mg  0.5 mg Oral TID PRN    levothyroxine (SYNTHROID) tablet 100 mcg  100 mcg Oral ACB    pravastatin (PRAVACHOL) tablet 40 mg  40 mg Oral DAILY    sertraline (ZOLOFT) tablet 200 mg  200 mg Oral QHS 0.9% sodium chloride infusion  125 mL/hr IntraVENous CONTINUOUS    sodium chloride (NS) flush 5-40 mL  5-40 mL IntraVENous Q8H    sodium chloride (NS) flush 5-40 mL  5-40 mL IntraVENous PRN    naloxone (NARCAN) injection 0.4 mg  0.4 mg IntraVENous PRN    calcium-vitamin D (OS-WYATT +D3) 500 mg-200 unit per tablet 1 Tablet  1 Tablet Oral TID WITH MEALS    senna-docusate (PERICOLACE) 8.6-50 mg per tablet 1 Tablet  1 Tablet Oral BID    polyethylene glycol (MIRALAX) packet 17 g  17 g Oral DAILY    [START ON 5/21/2021] bisacodyL (DULCOLAX) suppository 10 mg  10 mg Rectal DAILY PRN    acetaminophen (TYLENOL) tablet 650 mg  650 mg Oral Q6H    oxyCODONE IR (ROXICODONE) tablet 2.5 mg  2.5 mg Oral Q4H PRN    oxyCODONE IR (ROXICODONE) tablet 5 mg  5 mg Oral Q4H PRN    HYDROmorphone (DILAUDID) syringe 0.5 mg  0.5 mg IntraVENous Q4H PRN    ondansetron (ZOFRAN) injection 4 mg  4 mg IntraVENous Q4H PRN    enoxaparin (LOVENOX) injection 40 mg  40 mg SubCUTAneous DAILY    ceFAZolin (ANCEF) 2 g in sterile water (preservative free) 20 mL IV syringe  2 g IntraVENous Q8H    polyethylene glycol (MIRALAX) packet 17 g  17 g Oral DAILY PRN    naloxone (NARCAN) injection 0.4 mg  0.4 mg IntraVENous EVERY 2 MINUTES AS NEEDED    amLODIPine (NORVASC) tablet 5 mg  5 mg Oral DAILY    pantoprazole (PROTONIX) 40 mg in 0.9% sodium chloride 10 mL injection  40 mg IntraVENous Q24H    labetaloL (NORMODYNE) tablet 100 mg  100 mg Oral Q12H    hydrALAZINE (APRESOLINE) 20 mg/mL injection 20 mg  20 mg IntraVENous Q6H PRN      OBJECTIVE               Intake/Output Summary (Last 24 hours) at 5/20/2021 0957  Last data filed at 5/20/2021 0537  Gross per 24 hour   Intake 2399.58 ml   Output 1100 ml   Net 1299.58 ml       Review of Systems - History obtained from the patient AS PER  HPI    Telemetry NSR-ST    PHYSICAL EXAM        Visit Vitals  /69 (BP 1 Location: Right upper arm, BP Patient Position: At rest)   Pulse 98   Temp 98.2 °F (36.8 °C)   Resp 20   Ht 5' 8\" (1.727 m)   Wt 160 lb (72.6 kg)   SpO2 96%   BMI 24.33 kg/m²       Gen: Well-developed, elderly, in no acute distress  alert and oriented x 3  HEENT:  Pink conjunctivae, Hearing grossly normal.No scleral icterus or conjunctival, moist mucous membranes  Neck: Supple,No JVD  Resp: No accessory muscle use, diminished bases, shallow breathing   Card: Regular Rate,Rythm,3/6 murmur rsb, no rubs or gallop. No thrills. GI:          soft, non-tender   MSK: No cyanosis or clubbing  Skin: No rashes or ulcer  Neuro:  Cranial nerves are grossly intact, moving all four extremities, no focal deficit, follows commands appropriately  Psych:  Good insight, oriented to person, place and time, alert, Nml Affect  LE: No edema. Brace on left leg.  Left hip incision covered w CDI bandage        DATA REVIEW            Laboratory and Imaging have been reviewed by me and are notable for  Recent Labs     05/18/21  1428   TROIQ <0.05     Recent Labs     05/20/21  0501 05/19/21  0345 05/18/21  1428   NA  --  138 138   K  --  3.4* 3.2*   CO2  --  28 27   BUN  --  14 14   CREA  --  0.72 0.77   GLU  --  122* 115*   PHOS 3.0 2.7  --    MG 2.0 2.1 2.1   WBC 10.5 12.7* 11.0   HGB 10.6* 13.3 14.3   HCT 31.8* 40.0 43.6    201 237 [facility]

## 2021-05-20 NOTE — PROGRESS NOTES
Physical Therapy  Orders received and chart reviewed. Patient s/p Left hip hemiarthroplasty. WBAT. Upon assessment of the patient she has a knee immobilizer in place. NO order or MD note to indicate brace. Spoke to ortho PA, will need clarification on knee immobilizer prior to mobilizing. Thank you.   Valentina Sanchez PT,DPT,NCS

## 2021-05-20 NOTE — PROGRESS NOTES
11:50am:  CM met with patient and her brother, Supa Ayala, to discuss rehab options and obtain provider choice. [place] and would like to go to [address]. Her back up selections are the [address] of [facility]. Choice letter signed and referrals submitted. Transition of Care Plan - RUR 16% (low risk):  1. CM following- referrals placed to IPR and SNF, await response  2. Pt lives alone at home  3. S/P Left hip hemiarthroplasty POD#1  4. Cardiology following  5.  PT to evaluate; OT recommending IPR    Rafael Webb LCSW

## 2021-05-20 NOTE — PROGRESS NOTES
[facility] [address]  [address] [facility], [address] [place] [address]  [phone]      Medical Progress Note      NAME: Bala Marie   :  1955  MRM:  [id number]    Date/Time: 2021        Assessment / Plan:      70 yo F  W/ hx of HTN, hypothyroid presenting w/ uncontrolled HTN, L hip fx s/p fall     L hip fracture: traumatic, due to mechanical fall. Cont IV Dilaudid PRN pain, Lovenox for DVT ppx. PT/OT, DC planning     Severe aortic stenosis: symptomatic. Will need evaluation for AVR after she recovers from hip surgery. Cardiology following. Change labetalol to metoprolol    Malignant HTN urgency: BP much better. Continue Norvasc but change labetalol to metoprolol     Large hiatal hernia: evident on Xray. Cont PPI     Hypothyroid: TSH high. Uncertain of compliance. Add FT4. Continue LT4 at current dose for now    Depression/anxiety: cont Zoloft. Monitor for sedation with concomitant us of Xanax and opioid analgesics       Total time spent: 25 minutes  Time spent in the care of this patient including reviewing records, discussing with nursing and/or other providers on the treatment team, obtaining history and examining the patient, and discussing treatment plans. Care Plan discussed with: Patient, Nursing Staff and >50% of time spent in counseling and coordination of care    Discussed:  Care Plan and D/C Planning    Prophylaxis:  Lovenox    Disposition:  SNF/LTC         Subjective:     Chief Complaint:  Follow up hip fx    Chart/notes/labs/studies reviewed, patient examined. Feels okay. Some dyspnea. Leg pain controlled          Objective:       Vitals:        Last 24hrs VS reviewed since prior progress note.  Most recent are:    Visit Vitals  /75 (BP 1 Location: Right upper arm, BP Patient Position: At rest)   Pulse 100   Temp 98.7 °F (37.1 °C)   Resp 21   Ht 5' 7.99\" (1.727 m)   Wt 72.6 kg (160 lb)   SpO2 97%   BMI 24.33 kg/m²     SpO2 Readings from Last 6 Encounters:   05/20/21 97%    O2 Flow Rate (L/min): 3 l/min       Intake/Output Summary (Last 24 hours) at 5/20/2021 1736  Last data filed at 5/20/2021 1333  Gross per 24 hour   Intake 1399.58 ml   Output 1000 ml   Net 399.58 ml          Exam:     Physical Exam:    Gen:  Well-developed, well-nourished, in no acute distress  HEENT:  Atraumatic, normocephalic. Sclerae nonicteric, hearing intact to voice  Neck:  Supple, without apparent masses. Resp:  No accessory muscle use, mildly diminished without wheezes, rales, or rhonchi  Card: RRR, 3/6 systolic murmur RUSB radiating to carotid. No LE edema  Abd:  +bowel sounds, soft, NTTP, nondistended. No HSM  Neuro: Face symmetric, speech fluent, follows commands appropriately, no focal weakness or numbness  Psych:  Alert, oriented x 3.  Good insight     Medications Reviewed: (see below)    Lab Data Reviewed: (see below)    ______________________________________________________________________    Medications:     Current Facility-Administered Medications   Medication Dose Route Frequency    ALPRAZolam (XANAX) tablet 0.5 mg  0.5 mg Oral TID PRN    levothyroxine (SYNTHROID) tablet 100 mcg  100 mcg Oral ACB    pravastatin (PRAVACHOL) tablet 40 mg  40 mg Oral DAILY    sertraline (ZOLOFT) tablet 200 mg  200 mg Oral QHS    sodium chloride (NS) flush 5-40 mL  5-40 mL IntraVENous Q8H    sodium chloride (NS) flush 5-40 mL  5-40 mL IntraVENous PRN    naloxone (NARCAN) injection 0.4 mg  0.4 mg IntraVENous PRN    calcium-vitamin D (OS-WYATT +D3) 500 mg-200 unit per tablet 1 Tablet  1 Tablet Oral TID WITH MEALS    senna-docusate (PERICOLACE) 8.6-50 mg per tablet 1 Tablet  1 Tablet Oral BID    polyethylene glycol (MIRALAX) packet 17 g  17 g Oral DAILY    [START ON 5/21/2021] bisacodyL (DULCOLAX) suppository 10 mg  10 mg Rectal DAILY PRN    acetaminophen (TYLENOL) tablet 650 mg  650 mg Oral Q6H    oxyCODONE IR (ROXICODONE) tablet 2.5 mg  2.5 mg Oral Q4H PRN    oxyCODONE IR (ROXICODONE) tablet 5 mg  5 mg Oral Q4H PRN    HYDROmorphone (DILAUDID) syringe 0.5 mg  0.5 mg IntraVENous Q4H PRN    ondansetron (ZOFRAN) injection 4 mg  4 mg IntraVENous Q4H PRN    enoxaparin (LOVENOX) injection 40 mg  40 mg SubCUTAneous DAILY    polyethylene glycol (MIRALAX) packet 17 g  17 g Oral DAILY PRN    naloxone (NARCAN) injection 0.4 mg  0.4 mg IntraVENous EVERY 2 MINUTES AS NEEDED    amLODIPine (NORVASC) tablet 5 mg  5 mg Oral DAILY    pantoprazole (PROTONIX) 40 mg in 0.9% sodium chloride 10 mL injection  40 mg IntraVENous Q24H    labetaloL (NORMODYNE) tablet 100 mg  100 mg Oral Q12H    hydrALAZINE (APRESOLINE) 20 mg/mL injection 20 mg  20 mg IntraVENous Q6H PRN            Lab Review:     Recent Labs     05/20/21  0501 05/19/21  0345 05/18/21  1428   WBC 10.5 12.7* 11.0   HGB 10.6* 13.3 14.3   HCT 31.8* 40.0 43.6    201 216     Recent Labs     05/20/21  0501 05/19/21  0345 05/18/21  1428    138 138   K 4.0 3.4* 3.2*   * 105 105   CO2 25 28 27   * 122* 115*   BUN 27* 14 14   CREA 1.06* 0.72 0.77   CA 7.9* 8.2* 8.4*   MG 2.0 2.1 2.1   PHOS 3.0 2.7  --    ALB  --  3.6 4.2   ALT  --  22 25   INR  --   --  1.0     No components found for: GLPOC  No results for input(s): PH, PCO2, PO2, HCO3, FIO2 in the last 72 hours.   Recent Labs     05/18/21  1428   INR 1.0     No results found for: SDES  No results found for: CULT           ___________________________________________________    Attending Physician: Lory Butcher MD

## 2021-05-20 NOTE — PERIOP NOTES
TRANSFER - OUT REPORT:    Verbal report given to Yonathan lynch RN(name) on Xiomara Mutters  being transferred to [facility](unit) for routine post - op       Report consisted of patients Situation, Background, Assessment and   Recommendations(SBAR). Information from the following report(s) SBAR, Kardex, Intake/Output, MAR, Recent Results and Cardiac Rhythm RSR was reviewed with the receiving nurse. Lines:   Peripheral IV 05/18/21 Left Antecubital (Active)   Site Assessment Clean, dry, & intact 05/19/21 1800   Phlebitis Assessment 0 05/19/21 1800   Infiltration Assessment 0 05/19/21 1800   Dressing Status Clean, dry, & intact 05/19/21 1800   Dressing Type Transparent;Tape 05/19/21 1800   Hub Color/Line Status Patent; Infusing;Pink 05/19/21 1800   Action Taken Open ports on tubing capped 05/19/21 1800   Alcohol Cap Used Yes 05/19/21 1800       Peripheral IV 05/19/21 Left Hand (Active)   Site Assessment Clean, dry, & intact 05/19/21 1800   Phlebitis Assessment 0 05/19/21 1800   Infiltration Assessment 0 05/19/21 1800   Dressing Status Clean, dry, & intact 05/19/21 1800   Dressing Type Transparent;Tape 05/19/21 1800   Hub Color/Line Status Green;Patent 05/19/21 1800   Action Taken Open ports on tubing capped 05/19/21 1800   Alcohol Cap Used Yes 05/19/21 1800        Opportunity for questions and clarification was provided.       Patient transported with:   O2 @ 3 liters  Registered Nurse

## 2021-05-20 NOTE — CONSULTS
Comprehensive Nutrition Assessment      Type and Reason for Visit: Initial, Consult    Nutrition Recommendations/Plan:   1. Advance diet to Cardiac as able  2. Add Ensure HP BID for wound healing  3. Document PO intake, weight, GI status       Nutrition Assessment:       Pt admitted for Hip fracture ([facility] [address] [S72.009A]. Pt has a past medical history of Hernia, hiatal, Hypothyroidism, and OCD. Consult for ONS, s/p surgery. Pt continues on Clear liquid diet. Advance to Cardiac as able. No n/v, but appetite is currently lower than normal. Add Ensure HP when diet advances. No wt hx in chart. No reported wt changes PTA. No hx of chew/swallow difficulties. NKFA. Pt to d/c to IPR or SNF. Wt Readings from Last 10 Encounters:   05/19/21 72.6 kg (160 lb)       Malnutrition Assessment:  Malnutrition Status:   No malnutrition at this time        Estimated Daily Nutrient Needs:  Energy (kcal):  1700  Protein (g):  73 (1.0g/kg)       Fluid (ml/day):  1700    Documented meal intake:   Patient Vitals for the past 168 hrs:   % Diet Eaten   05/19/21 0821 0%       Documented Supplement intake:  No data found. Nutrition Related Findings:     Last BM 5/17, no edema noted.        Nutritionally Significant Medications:   Calcium/VitD, Ancef, Lovenox, Lasix, Synthroid, Protonix, Miralax, Pericolace,       Wounds:    Wound consult pending       Current Nutrition Therapies:  DIET CLEAR LIQUID    Anthropometric Measures:  · Height:  5' 7.99\" (172.7 cm)  · Current Body Wt:  72.6 kg (160 lb 0.9 oz)   · Admission Body Wt:    72.6 kg   · Usual Body Wt:   72.6 kg     · Ideal Body Wt:  140 lbs:  114.3 %   · BMI Category:  Normal weight (BMI 22.0-24.9) age over 72       Nutrition Diagnosis:   · Inadequate protein-energy intake related to increased demand for energy/nutrients as evidenced by intake 0-25%, other (specify)      Nutrition Interventions:   Food and/or Nutrient Delivery: Modify current diet, Start oral nutrition supplement  Nutrition Education and Counseling: No recommendations at this time  Coordination of Nutrition Care: Continue to monitor while inpatient, Interdisciplinary rounds    Goals:  Pt will consume >25% of meals w diet advancement within 2-4 days       Nutrition Monitoring and Evaluation:   Behavioral-Environmental Outcomes: None identified  Food/Nutrient Intake Outcomes: Food and nutrient intake, Supplement intake  Physical Signs/Symptoms Outcomes: Biochemical data, Weight, Skin    Discharge Planning:    Continue oral nutrition supplement, Continue current diet     Electronically signed by Aiden Burnett     Contact: [phone]

## 2021-05-20 NOTE — ROUTINE PROCESS
Bedside shift change report given to Suhail (oncoming nurse) by Fanny Gutierrez (offgoing nurse). Report included the following information SBAR, Kardex, Intake/Output, MAR, Accordion and Recent Results.

## 2021-05-20 NOTE — PROGRESS NOTES
ASSESSMENT     Mai trinidad is a 78 y.o. female, who is POD# 1 s/p left hip hemiarthroplasty. Patient doing better than prior to surgery. PLAN    1. Pain control  2. Mobilize with PT / OT. Weight bearing Status : WBAT  3. DVT Prophylaxis : Mechanical / Lovenox 40 mg SQ daily  4. Posterior hip precautions. Knee immobilizer while in bed. May remove when out of bed and upon discharge. 5. Disposition : Case Management for planning. Likely SNF when cleared with PT/OT. Ismael Ridley PA-C  Office: [phone]  Cell: [phone]     Subjective:     Resting, complaining of appropriate pain. States pain better than before surgery and much more alert today. Has not ambulated yet with therapy. Objective:     Patient Vitals for the past 12 hrs:   BP Temp Pulse Resp SpO2   05/20/21 1130 121/89 98.8 °F (37.1 °C) (!) 110 20 93 %   05/20/21 1030 125/88  (!) 104 20 (!) 84 %   05/20/21 0730 115/69 98.2 °F (36.8 °C) 98  96 %   05/20/21 0700   98     05/20/21 0452 126/69 98.4 °F (36.9 °C) (!) 103 20 94 %       GENERAL: No acute distress. MUSCULOSKELETAL EXAM  Left lower extremity - Dressing in tact and without drainage. Calf and thigh soft. Skin is warm and well perfused.   Sensation grossly in tact to light touch.  + dorsiflexion, plantarflexion, EHL    LABS :  Recent Labs     05/20/21  0501 05/19/21  0345 05/18/21  1428   HGB 10.6* 13.3 14.3   HCT 31.8* 40.0 43.6   INR  --   --  1.0   NA  --  138 138   K  --  3.4* 3.2*   CL  --  105 105   CO2  --  28 27   BUN  --  14 14   CREA  --  0.72 0.77   GLU  --  122* 115*   WBC 10.5 12.7* 11.0       No results found for: SDES    No results found for: CULT

## 2021-05-20 NOTE — PROGRESS NOTES
Problem: Falls - Risk of  Goal: *Absence of Falls  Description: Document Aysha Muñoz Fall Risk and appropriate interventions in the flowsheet.   Outcome: Progressing Towards Goal  Note: Fall Risk Interventions:       Mentation Interventions: Adequate sleep, hydration, pain control, Bed/chair exit alarm    Medication Interventions: Bed/chair exit alarm, Patient to call before getting OOB, Teach patient to arise slowly    Elimination Interventions: Bed/chair exit alarm, Call light in reach    History of Falls Interventions: Bed/chair exit alarm, Door open when patient unattended

## 2021-05-21 LAB
ALBUMIN SERPL-MCNC: 2.7 G/DL (ref 3.5–5)
ALBUMIN/GLOB SERPL: 0.7 {RATIO} (ref 1.1–2.2)
ALP SERPL-CCNC: 65 U/L (ref 45–117)
ALT SERPL-CCNC: 24 U/L (ref 12–78)
ANION GAP SERPL CALC-SCNC: 2 MMOL/L (ref 5–15)
AST SERPL-CCNC: 46 U/L (ref 15–37)
BASOPHILS # BLD: 0 K/UL (ref 0–0.1)
BASOPHILS NFR BLD: 0 % (ref 0–1)
BILIRUB SERPL-MCNC: 0.6 MG/DL (ref 0.2–1)
BUN SERPL-MCNC: 22 MG/DL (ref 6–20)
BUN/CREAT SERPL: 39 (ref 12–20)
CALCIUM SERPL-MCNC: 8.6 MG/DL (ref 8.5–10.1)
CHLORIDE SERPL-SCNC: 106 MMOL/L (ref 97–108)
CO2 SERPL-SCNC: 32 MMOL/L (ref 21–32)
CREAT SERPL-MCNC: 0.56 MG/DL (ref 0.55–1.02)
DIFFERENTIAL METHOD BLD: ABNORMAL
EOSINOPHIL # BLD: 0.1 K/UL (ref 0–0.4)
EOSINOPHIL NFR BLD: 1 % (ref 0–7)
ERYTHROCYTE [DISTWIDTH] IN BLOOD BY AUTOMATED COUNT: 13.2 % (ref 11.5–14.5)
GLOBULIN SER CALC-MCNC: 3.7 G/DL (ref 2–4)
GLUCOSE SERPL-MCNC: 104 MG/DL (ref 65–100)
HCT VFR BLD AUTO: 31.9 % (ref 35–47)
HGB BLD-MCNC: 10.3 G/DL (ref 11.5–16)
IMM GRANULOCYTES # BLD AUTO: 0.1 K/UL (ref 0–0.04)
IMM GRANULOCYTES NFR BLD AUTO: 1 % (ref 0–0.5)
LYMPHOCYTES # BLD: 0.9 K/UL (ref 0.8–3.5)
LYMPHOCYTES NFR BLD: 10 % (ref 12–49)
MAGNESIUM SERPL-MCNC: 2.2 MG/DL (ref 1.6–2.4)
MCH RBC QN AUTO: 30.5 PG (ref 26–34)
MCHC RBC AUTO-ENTMCNC: 32.3 G/DL (ref 30–36.5)
MCV RBC AUTO: 94.4 FL (ref 80–99)
MONOCYTES # BLD: 0.9 K/UL (ref 0–1)
MONOCYTES NFR BLD: 10 % (ref 5–13)
NEUTS SEG # BLD: 7.5 K/UL (ref 1.8–8)
NEUTS SEG NFR BLD: 78 % (ref 32–75)
NRBC # BLD: 0 K/UL (ref 0–0.01)
NRBC BLD-RTO: 0 PER 100 WBC
PHOSPHATE SERPL-MCNC: 1.9 MG/DL (ref 2.6–4.7)
PLATELET # BLD AUTO: 156 K/UL (ref 150–400)
PMV BLD AUTO: 10.6 FL (ref 8.9–12.9)
POTASSIUM SERPL-SCNC: 3 MMOL/L (ref 3.5–5.1)
PROT SERPL-MCNC: 6.4 G/DL (ref 6.4–8.2)
RBC # BLD AUTO: 3.38 M/UL (ref 3.8–5.2)
SARS-COV-2, XPLCVT: NOT DETECTED
SODIUM SERPL-SCNC: 140 MMOL/L (ref 136–145)
SOURCE, COVRS: NORMAL
T4 FREE SERPL-MCNC: 1.4 NG/DL (ref 0.8–1.5)
WBC # BLD AUTO: 9.5 K/UL (ref 3.6–11)

## 2021-05-21 PROCEDURE — 83735 ASSAY OF MAGNESIUM: CPT

## 2021-05-21 PROCEDURE — 85025 COMPLETE CBC W/AUTO DIFF WBC: CPT

## 2021-05-21 PROCEDURE — 74011250637 HC RX REV CODE- 250/637: Performed by: PHYSICIAN ASSISTANT

## 2021-05-21 PROCEDURE — 65660000000 HC RM CCU STEPDOWN

## 2021-05-21 PROCEDURE — 80053 COMPREHEN METABOLIC PANEL: CPT

## 2021-05-21 PROCEDURE — 97535 SELF CARE MNGMENT TRAINING: CPT | Performed by: OCCUPATIONAL THERAPIST

## 2021-05-21 PROCEDURE — 84100 ASSAY OF PHOSPHORUS: CPT

## 2021-05-21 PROCEDURE — 74011250636 HC RX REV CODE- 250/636: Performed by: PHYSICIAN ASSISTANT

## 2021-05-21 PROCEDURE — 36415 COLL VENOUS BLD VENIPUNCTURE: CPT

## 2021-05-21 PROCEDURE — 74011250636 HC RX REV CODE- 250/636: Performed by: NURSE PRACTITIONER

## 2021-05-21 PROCEDURE — 97530 THERAPEUTIC ACTIVITIES: CPT | Performed by: OCCUPATIONAL THERAPIST

## 2021-05-21 PROCEDURE — 97110 THERAPEUTIC EXERCISES: CPT

## 2021-05-21 PROCEDURE — 74011250637 HC RX REV CODE- 250/637: Performed by: INTERNAL MEDICINE

## 2021-05-21 PROCEDURE — 99231 SBSQ HOSP IP/OBS SF/LOW 25: CPT | Performed by: INTERNAL MEDICINE

## 2021-05-21 PROCEDURE — 84439 ASSAY OF FREE THYROXINE: CPT

## 2021-05-21 PROCEDURE — 97530 THERAPEUTIC ACTIVITIES: CPT

## 2021-05-21 PROCEDURE — 74011250637 HC RX REV CODE- 250/637: Performed by: NURSE PRACTITIONER

## 2021-05-21 RX ORDER — PANTOPRAZOLE SODIUM 40 MG/1
40 TABLET, DELAYED RELEASE ORAL
Status: DISCONTINUED | OUTPATIENT
Start: 2021-05-21 | End: 2021-05-22 | Stop reason: HOSPADM

## 2021-05-21 RX ORDER — POTASSIUM CHLORIDE 750 MG/1
40 TABLET, FILM COATED, EXTENDED RELEASE ORAL
Status: COMPLETED | OUTPATIENT
Start: 2021-05-21 | End: 2021-05-21

## 2021-05-21 RX ORDER — FUROSEMIDE 10 MG/ML
40 INJECTION INTRAMUSCULAR; INTRAVENOUS ONCE
Status: DISCONTINUED | OUTPATIENT
Start: 2021-05-21 | End: 2021-05-21

## 2021-05-21 RX ORDER — METOPROLOL TARTRATE 50 MG/1
100 TABLET ORAL EVERY 12 HOURS
Status: DISCONTINUED | OUTPATIENT
Start: 2021-05-21 | End: 2021-05-21

## 2021-05-21 RX ORDER — METOPROLOL TARTRATE 50 MG/1
50 TABLET ORAL EVERY 12 HOURS
Status: DISCONTINUED | OUTPATIENT
Start: 2021-05-21 | End: 2021-05-21

## 2021-05-21 RX ORDER — FUROSEMIDE 10 MG/ML
20 INJECTION INTRAMUSCULAR; INTRAVENOUS ONCE
Status: COMPLETED | OUTPATIENT
Start: 2021-05-21 | End: 2021-05-21

## 2021-05-21 RX ORDER — METOPROLOL TARTRATE 50 MG/1
100 TABLET ORAL EVERY 12 HOURS
Status: DISCONTINUED | OUTPATIENT
Start: 2021-05-21 | End: 2021-05-22 | Stop reason: HOSPADM

## 2021-05-21 RX ORDER — SODIUM,POTASSIUM PHOSPHATES 280-250MG
2 POWDER IN PACKET (EA) ORAL EVERY 4 HOURS
Status: COMPLETED | OUTPATIENT
Start: 2021-05-21 | End: 2021-05-21

## 2021-05-21 RX ORDER — CLOMIPRAMINE HYDROCHLORIDE 25 MG/1
100 CAPSULE ORAL
Status: DISCONTINUED | OUTPATIENT
Start: 2021-05-21 | End: 2021-05-22 | Stop reason: HOSPADM

## 2021-05-21 RX ADMIN — Medication 10 ML: at 21:10

## 2021-05-21 RX ADMIN — Medication 10 ML: at 07:07

## 2021-05-21 RX ADMIN — Medication 1 TABLET: at 17:27

## 2021-05-21 RX ADMIN — DOCUSATE SODIUM 50MG AND SENNOSIDES 8.6MG 1 TABLET: 8.6; 5 TABLET, FILM COATED ORAL at 17:27

## 2021-05-21 RX ADMIN — SERTRALINE HYDROCHLORIDE 200 MG: 50 TABLET, FILM COATED ORAL at 21:09

## 2021-05-21 RX ADMIN — ACETAMINOPHEN 650 MG: 325 TABLET ORAL at 07:06

## 2021-05-21 RX ADMIN — POTASSIUM & SODIUM PHOSPHATES POWDER PACK 280-160-250 MG 2 PACKET: 280-160-250 PACK at 10:19

## 2021-05-21 RX ADMIN — CLOMIPRAMINE HYDROCHLORIDE 100 MG: 25 CAPSULE ORAL at 21:09

## 2021-05-21 RX ADMIN — PRAVASTATIN SODIUM 40 MG: 20 TABLET ORAL at 10:18

## 2021-05-21 RX ADMIN — METOPROLOL TARTRATE 100 MG: 50 TABLET, FILM COATED ORAL at 21:09

## 2021-05-21 RX ADMIN — POTASSIUM CHLORIDE 40 MEQ: 750 TABLET, EXTENDED RELEASE ORAL at 10:19

## 2021-05-21 RX ADMIN — POTASSIUM & SODIUM PHOSPHATES POWDER PACK 280-160-250 MG 2 PACKET: 280-160-250 PACK at 12:39

## 2021-05-21 RX ADMIN — FUROSEMIDE 20 MG: 10 INJECTION, SOLUTION INTRAMUSCULAR; INTRAVENOUS at 10:19

## 2021-05-21 RX ADMIN — LEVOTHYROXINE SODIUM 100 MCG: 0.1 TABLET ORAL at 07:06

## 2021-05-21 RX ADMIN — AMLODIPINE BESYLATE 5 MG: 5 TABLET ORAL at 21:09

## 2021-05-21 RX ADMIN — Medication 10 ML: at 17:30

## 2021-05-21 RX ADMIN — ENOXAPARIN SODIUM 40 MG: 40 INJECTION SUBCUTANEOUS at 10:19

## 2021-05-21 RX ADMIN — ACETAMINOPHEN 650 MG: 325 TABLET ORAL at 12:39

## 2021-05-21 RX ADMIN — Medication 1 TABLET: at 12:39

## 2021-05-21 RX ADMIN — ACETAMINOPHEN 650 MG: 325 TABLET ORAL at 01:41

## 2021-05-21 RX ADMIN — DOCUSATE SODIUM 50MG AND SENNOSIDES 8.6MG 1 TABLET: 8.6; 5 TABLET, FILM COATED ORAL at 10:19

## 2021-05-21 RX ADMIN — POTASSIUM CHLORIDE 40 MEQ: 750 TABLET, EXTENDED RELEASE ORAL at 12:39

## 2021-05-21 RX ADMIN — POLYETHYLENE GLYCOL 3350 17 G: 17 POWDER, FOR SOLUTION ORAL at 10:20

## 2021-05-21 RX ADMIN — ACETAMINOPHEN 650 MG: 325 TABLET ORAL at 23:43

## 2021-05-21 RX ADMIN — METOPROLOL TARTRATE 100 MG: 50 TABLET, FILM COATED ORAL at 10:18

## 2021-05-21 RX ADMIN — Medication 1 TABLET: at 10:18

## 2021-05-21 RX ADMIN — PANTOPRAZOLE SODIUM 40 MG: 40 TABLET, DELAYED RELEASE ORAL at 17:27

## 2021-05-21 RX ADMIN — ACETAMINOPHEN 650 MG: 325 TABLET ORAL at 17:27

## 2021-05-21 NOTE — PROGRESS NOTES
[address] [place] [address], [address] [place] [address]  [phone]      Medical Progress Note      NAME: Chris Stover   :  1955  MRM:  [id number]    Date/Time: 2021        Assessment / Plan:      70 yo F  W/ hx of HTN, hypothyroid presenting w/ uncontrolled HTN, L hip fx s/p fall     L hip fracture: traumatic, due to mechanical fall. Cont IV Dilaudid PRN pain, Lovenox for DVT ppx. PT/OT, DC planning     Severe aortic stenosis: symptomatic. Informed patient that she will need evaluation for AVR after she recovers from hip surgery. Cardiology following. Changed labetalol to metoprolol    Malignant HTN urgency: BP much better. Continue Norvasc and metoprolol     Large hiatal hernia: evident on Xray. Cont PPI     Hypothyroid: TSH high. Uncertain of compliance. FT4 WNR. Continue Synthroid at current dose     Depression/anxiety: cont Zoloft. Monitor for sedation with concomitant us of Xanax and opioid analgesics       Total time spent: 35 minutes, d/w CM. Anticipate DC tomorrow  Time spent in the care of this patient including reviewing records, discussing with nursing and/or other providers on the treatment team, obtaining history and examining the patient, and discussing treatment plans. Care Plan discussed with: Patient, Nursing Staff and >50% of time spent in counseling and coordination of care    Discussed:  Care Plan and D/C Planning    Prophylaxis:  Lovenox    Disposition:  SNF/LTC         Subjective:     Chief Complaint:  Follow up hip fx    Chart/notes/labs/studies reviewed, patient examined. Feels fine. NO CP. Leg pain controlled. No F/c          Objective:       Vitals:        Last 24hrs VS reviewed since prior progress note.  Most recent are:    Visit Vitals  BP (!) 146/74 (BP 1 Location: Right upper arm, BP Patient Position: At rest)   Pulse 75   Temp 98.2 °F (36.8 °C)   Resp 22   Ht 5' 7.99\" (1.727 m)   Wt 82.8 kg (182 lb 9.6 oz)   SpO2 91%   BMI 27.77 kg/m²     SpO2 Readings from Last 6 Encounters:   05/21/21 91%    O2 Flow Rate (L/min): 3 l/min       Intake/Output Summary (Last 24 hours) at 5/21/2021 1836  Last data filed at 5/21/2021 1123  Gross per 24 hour   Intake 630 ml   Output 1500 ml   Net -870 ml          Exam:     Physical Exam:    Gen:  Well-developed, well-nourished, in no acute distress. Pleasant   HEENT:  Atraumatic, normocephalic. Sclerae nonicteric, hearing intact to voice  Neck:  Supple, without apparent masses. Resp:  No accessory muscle use, mildly diminished without wheezes, rales, or rhonchi  Card: RRR, 3/6 systolic murmur RUSB radiating to carotid. No LE edema  Abd:  +bowel sounds, soft, NTTP, nondistended. No HSM  Neuro: Face symmetric, speech fluent, follows commands appropriately, no focal weakness or numbness  Psych:  Alert, oriented x 3.  Good insight     Medications Reviewed: (see below)    Lab Data Reviewed: (see below)    ______________________________________________________________________    Medications:     Current Facility-Administered Medications   Medication Dose Route Frequency    metoprolol tartrate (LOPRESSOR) tablet 100 mg  100 mg Oral Q12H    pantoprazole (PROTONIX) tablet 40 mg  40 mg Oral ACD    clomiPRAMINE (ANAFRANIL) capsule 100 mg  100 mg Oral QHS    ALPRAZolam (XANAX) tablet 0.5 mg  0.5 mg Oral TID PRN    levothyroxine (SYNTHROID) tablet 100 mcg  100 mcg Oral ACB    pravastatin (PRAVACHOL) tablet 40 mg  40 mg Oral DAILY    sertraline (ZOLOFT) tablet 200 mg  200 mg Oral QHS    sodium chloride (NS) flush 5-40 mL  5-40 mL IntraVENous Q8H    sodium chloride (NS) flush 5-40 mL  5-40 mL IntraVENous PRN    naloxone (NARCAN) injection 0.4 mg  0.4 mg IntraVENous PRN    calcium-vitamin D (OS-WYATT +D3) 500 mg-200 unit per tablet 1 Tablet  1 Tablet Oral TID WITH MEALS    senna-docusate (PERICOLACE) 8.6-50 mg per tablet 1 Tablet  1 Tablet Oral BID    polyethylene glycol (MIRALAX) packet 17 g  17 g Oral DAILY    bisacodyL (DULCOLAX) suppository 10 mg  10 mg Rectal DAILY PRN    acetaminophen (TYLENOL) tablet 650 mg  650 mg Oral Q6H    oxyCODONE IR (ROXICODONE) tablet 2.5 mg  2.5 mg Oral Q4H PRN    oxyCODONE IR (ROXICODONE) tablet 5 mg  5 mg Oral Q4H PRN    enoxaparin (LOVENOX) injection 40 mg  40 mg SubCUTAneous DAILY    polyethylene glycol (MIRALAX) packet 17 g  17 g Oral DAILY PRN    naloxone (NARCAN) injection 0.4 mg  0.4 mg IntraVENous EVERY 2 MINUTES AS NEEDED    amLODIPine (NORVASC) tablet 5 mg  5 mg Oral DAILY    hydrALAZINE (APRESOLINE) 20 mg/mL injection 20 mg  20 mg IntraVENous Q6H PRN            Lab Review:     Recent Labs     05/21/21  0715 05/20/21  0501 05/19/21  0345   WBC 9.5 10.5 12.7*   HGB 10.3* 10.6* 13.3   HCT 31.9* 31.8* 40.0    177 201     Recent Labs     05/21/21  0715 05/20/21  0501 05/19/21  0345    140 138   K 3.0* 4.0 3.4*    109* 105   CO2 32 25 28   * 107* 122*   BUN 22* 27* 14   CREA 0.56 1.06* 0.72   CA 8.6 7.9* 8.2*   MG 2.2 2.0 2.1   PHOS 1.9* 3.0 2.7   ALB 2.7*  --  3.6   ALT 24  --  22     No components found for: GLPOC  No results for input(s): PH, PCO2, PO2, HCO3, FIO2 in the last 72 hours. No results for input(s): INR, INREXT, INREXT in the last 72 hours.   No results found for: SDES  No results found for: CULT           ___________________________________________________    Attending Physician: Karine Venegas MD

## 2021-05-21 NOTE — PROGRESS NOTES
Doing well. Appears stable from cardiac standpoint. Agree with holding further diuretics. DC planning. DC to IRF when cleared by ortho/PT/OT. Discussed with CM.

## 2021-05-21 NOTE — PROGRESS NOTES
ASSESSMENT     Bala Marie is a 78 y.o. female, who is POD# 2 s/p left hip hemiarthroplasty. Patient doing well. PLAN    1. Pain control  2. Mobilize with PT / OT. Weight bearing Status : WBAT  3. DVT Prophylaxis : Mechanical / Lovenox 40 mg SQ daily  4. Posterior hip precautions. Knee immobilizer while in bed. May remove when out of bed and upon discharge. 5. Disposition : Case Management for planning. Likely SNF when cleared with PT/OT. Robert eBck PA-C  Office : [phone]  Cell: [phone]     Subjective:     Resting, complaining of appropriate pain. States that she was able to sleep through the night and transferred to chair with PT.     Objective:     Patient Vitals for the past 12 hrs:   BP Temp Pulse Resp SpO2 Weight   05/21/21 0843 (!) 168/85 98.3 °F (36.8 °C) 99 19 97 %    05/21/21 0742      82.8 kg (182 lb 9.6 oz)   05/21/21 0700   (!) 102      05/21/21 0006 (!) 175/90 97 °F (36.1 °C) (!) 112 26 90 %    05/20/21 2344   (!) 109          GENERAL: No acute distress. MUSCULOSKELETAL EXAM  Left lower extremity - Dressing in tact without drainage. Bruising at incision. Calf and thigh soft. Skin is warm and well perfused.   Sensation grossly in tact to light touch.  + dorsiflexion, plantarflexion, EHL    LABS :  Recent Labs     05/21/21  0715 05/18/21  1428   HGB 10.3* 14.3   HCT 31.9* 43.6   INR  --  1.0    138   K 3.0* 3.2*    105   CO2 32 27   BUN 22* 14   CREA 0.56 0.77   * 115*   WBC 9.5 11.0       No results found for: SDES    No results found for: CULT

## 2021-05-21 NOTE — PROGRESS NOTES
Problem: Self Care Deficits Care Plan (Adult)  Goal: *Acute Goals and Plan of Care (Insert Text)  Description:   FUNCTIONAL STATUS PRIOR TO ADMISSION: Patient was modified independent for functional mobility. HOME SUPPORT: The patient lived alone with family local.    Occupational Therapy Goals  Initiated 5/20/2021  1. Patient will perform grooming and upper body dressing seated EOB with supervision/set-up within 7 day(s). 2.  Patient will perform lower body dressing with moderate assistance using AE following her posterior hip precautions within 7 day(s). 3.  Patient will perform toilet transfers with minimal assistance/contact guard assist within 7 day(s). 4.  Patient will perform all aspects of toileting with minimal assistance/contact guard assist within 7 day(s). 5.  Patient will participate in upper extremity therapeutic exercise/activities with supervision/set-up for 10 minutes within 7 day(s). 6.  Patient will utilize energy conservation techniques during functional activities with verbal and visual cues within 7 day(s). Outcome: Progressing Towards Goal     OCCUPATIONAL THERAPY TREATMENT  Patient: Adan Elena (52 y.o. female)  Date: 5/21/2021  Diagnosis: Hip fracture ([address] Charlene Wright <principal problem not specified>  Procedure(s) (LRB):  LEFT HIP HEMIARTHROPLASTY (Left) 2 Days Post-Op  Precautions: Fall, WBAT, Total hip (posterior,knee immobilizer in bed for proper positioning LLE)  Chart, occupational therapy assessment, plan of care, and goals were reviewed. ASSESSMENT  Patient continues with skilled OT services and is progressing towards goals. She remains cognitively impaired with decreased attention to task and requires significant physical and cognitive assist for ADLs and functional mobility. She requires constant cues for ADL participation and unable to recall 2/3 posterior hip precautions or follow.   Recommend IP rehab at discharge as pt was living alone and independent with ADLs and functional mobility. Current Level of Function Impacting Discharge (ADLs): total A LE ADLs, toileting and mod A x2 functional mobility    Other factors to consider for discharge: see above         PLAN :  Patient continues to benefit from skilled intervention to address the above impairments. Continue treatment per established plan of care. to address goals. Recommend with staff: BSC for toileting and chair for meals    Recommend next OT session: ADLs, review posterior hip precautions    Recommendation for discharge: (in order for the patient to meet his/her long term goals)  Therapy 3 hours per day 5-7 days per week    This discharge recommendation:  Has been made in collaboration with the attending provider and/or case management    IF patient discharges home will need the following DME: TBD       SUBJECTIVE:   Patient stated I am ok.     OBJECTIVE DATA SUMMARY:   Cognitive/Behavioral Status:  Neurologic State: Alert;Confused  Orientation Level: Oriented to person;Oriented to place;Oriented to situation;Disoriented to time  Cognition: Decreased attention/concentration;Decreased command following;Poor safety awareness  Perception: Cues to maintain midline in standing; Tactile;Verbal;Visual  Perseveration: No perseveration noted  Safety/Judgement: Awareness of environment;Decreased awareness of need for assistance;Decreased awareness of need for safety;Decreased insight into deficits; Fall prevention    Functional Mobility and Transfers for ADLs:  Bed Mobility:  Supine to Sit: Moderate assistance;Assist x2  Sit to Supine: Moderate assistance;Assist x2    Transfers:  Sit to Stand: Moderate assistance;Assist x2  Functional Transfers  Toilet Transfer : Moderate assistance; Additional time;Assist x2  Cues: Physical assistance; Tactile cues provided;Verbal cues provided;Visual cues provided  Adaptive Equipment: Bedside commode;Walker (comment) (rolling walker)  Bed to Chair: Moderate assistance;Assist x2    Balance:  Sitting: Intact; With support  Standing: Impaired; With support  Standing - Static: Poor;Constant support  Standing - Dynamic : Poor;Constant support    ADL Intervention:  Grooming  Grooming Assistance: Minimum assistance (decreased attention to task)  Brushing/Combing Hair: Minimum assistance (did not recognize she hadn't brushed back of head)  Cues: Physical assistance;Verbal cues provided;Visual cues provided (max cues to brush back of head)    Lower Body Dressing Assistance  Socks: Total assistance (dependent)  Position Performed: Seated edge of bed (unaware of posterior hip precautions and unable to follow)  Cues: Don;Physical assistance; Tactile cues provided;Verbal cues provided;Visual cues provided    Toileting  Toileting Assistance: Total assistance(dependent)  Bladder Hygiene: Total assistance (dependent)  Bowel Hygiene: Total assistance (dependent)  Clothing Management: Total assistance (dependent)  Cues: Tactile cues provided;Verbal cues provided;Visual cues provided  Adaptive Equipment: Walker [facility])    Cognitive Retraining  Orientation Retraining: Reorienting  Problem Solving: Identifying the task  Executive Functions: Executing cognitive plans  Safety/Judgement: Awareness of environment;Decreased awareness of need for assistance;Decreased awareness of need for safety;Decreased insight into deficits; Fall prevention    Patient recalled and demonstrated avoiding extreme planes of movement with Left LE during ADLs and functional mobility with max cues. Pain:  States LLE pain, but no number given    Activity Tolerance:   Fair and requires rest breaks  Please refer to the flowsheet for vital signs taken during this treatment. After treatment patient left in no apparent distress:   Sitting in chair, Call bell within reach, and Bed / chair alarm activated    COMMUNICATION/COLLABORATION:   The patients plan of care was discussed with: Physical therapist and Registered nurse.      Ankit Tran Micaela Dai, OT  Time Calculation: 28 mins

## 2021-05-21 NOTE — PROGRESS NOTES
Bedside and Verbal shift change report given to [address], RN (oncoming nurse) by Laura Cummins RN (offgoing nurse). Report included the following information SBAR, Kardex, ED Summary, MAR, Recent Results and Cardiac Rhythm SR. This patient was assisted with Intentional Toileting every 2 hours during this shift. Documentation of ambulation and output reflected on Flowsheet. Bedside and Verbal shift change report given to Linsey Herzog RN (oncoming nurse) by [address], RN (offgoing nurse).  Report included the following information SBAR, Kardex, ED Summary, MAR, Recent Results and Cardiac Rhythm SR.

## 2021-05-21 NOTE — PROGRESS NOTES
Orthopedic NP Progress Note  Post Op day: 2 Days Post-Op    May 21, 2021 10:02 AM     Bert Castillo    Attending Physician: Treatment Team: Attending Provider: Polo Avina MD; Consulting Provider: Polo Avina MD; Consulting Provider: Leeanne Edge MD; Consulting Provider: Jono Dhaliwal MD; Consulting Provider: CARLIE Arce; Utilization Review: Marisol Muse; Care Manager: Ana Barnett     Vital Signs:    Patient Vitals for the past 8 hrs:   BP Temp Pulse Resp SpO2 Weight   05/21/21 0843 (!) 168/85 98.3 °F (36.8 °C) 99 19 97 %    05/21/21 0742      82.8 kg (182 lb 9.6 oz)   05/21/21 0700   (!) 102        BMI (calculated): 27.8 (05/21/21 0742)    Intake/Output:  05/21 0701 - 05/21 1900  In: 120 [P.O.:120]  Out: 800 [Urine:800]  05/19 1901 - 05/21 0700  In: 1549.6 [P.O.:310; I.V.:1239.6]  Out: 2050 [Urine:2050]    Pain Control:   Pain Assessment  Pain Scale 1: Numeric (0 - 10)  Pain Intensity 1: 3  Pain Onset 1: acute  Pain Location 1: Leg  Pain Orientation 1: Left  Pain Description 1: Aching  Pain Intervention(s) 1: Medication (see MAR)    LAB:    Recent Labs     05/21/21 0715   HCT 31.9*   HGB 10.3*     Lab Results   Component Value Date/Time    Sodium 140 05/21/2021 07:15 AM    Potassium 3.0 (L) 05/21/2021 07:15 AM    Chloride 106 05/21/2021 07:15 AM    CO2 32 05/21/2021 07:15 AM    Glucose 104 (H) 05/21/2021 07:15 AM    BUN 22 (H) 05/21/2021 07:15 AM    Creatinine 0.56 05/21/2021 07:15 AM    Calcium 8.6 05/21/2021 07:15 AM       Subjective:  Bert Castillo is a 78 y.o. female s/p a  Procedure(s):  LEFT HIP HEMIARTHROPLASTY   Procedure(s):  LEFT HIP HEMIARTHROPLASTY. Tolerating diet. Pt up in chair, states she has walked to BR and that pain is well managed        Objective: General: alert, cooperative, no distress. Neuro/Vascular: CNS Intact. Sensation stable.  Brisk cap refill, + pulses UE/LE  Musculoskeletal:  FROM UE/LE with knee immobilizer to LLE  Skin: Incision - clean, dry and intact. No significant erythema or swelling.     Dressing: clean, dry, and intact    Salazar - n  Drain - n       PT/OT:   Gait:                      Assessment:    s/p Procedure(s):  LEFT HIP HEMIARTHROPLASTY    Active Problems:    Hypothyroidism ()      Hernia, hiatal ()      HTN (hypertension) ()      HLD (hyperlipidemia) ()      Hip fracture ([facility] [address] (5/18/2021)         Plan:   -  Continue PT/OT - WBAT, knee immobilizer while in bed for posterior hip precautions, ok to remove for PT when ambulating, Avoid extremes of hip motion,  Plan to remove knee immobilizer following hospitalization  -  Continue established methods of pain control  -  VTE Prophylaxes - TEDS &/or SCDs with lovenox daily for 1 month     Discharge To:  SNF    Signed By: Nina Vera NP    Orthopedic Nurse Practitioner

## 2021-05-21 NOTE — PROGRESS NOTES
2000 Hour: MEWS Score 3. D/t elevated heart rate and elevated blood pressure. . Plan is to monitor heart rate and B/P. Nofify MD of any changes or urgent care.

## 2021-05-21 NOTE — PROGRESS NOTES
Problem: Mobility Impaired (Adult and Pediatric)  Goal: *Acute Goals and Plan of Care (Insert Text)  Description:        Problem: Mobility Impaired (Adult and Pediatric)  Goal: *Acute Goals and Plan of Care (Insert Text)  Description:        Outcome: Progressing Towards Goal  Note: FUNCTIONAL STATUS PRIOR TO ADMISSION: Patient was independent and active without use of DME.    HOME SUPPORT PRIOR TO ADMISSION: The patient lived alone with no local support. Physical Therapy Goals  Initiated 5/20/2021  1. Patient will move from supine to sit and sit to supine  in bed with minimal assistance/contact guard assist within 7 day(s). 2.  Patient will transfer from bed to chair and chair to bed with minimal assistance/contact guard assist using the least restrictive device within 7 day(s). 3.  Patient will perform sit to stand with minimal assistance/contact guard assist within 7 day(s). 4.  Patient will ambulate with minimal assistance/contact guard assist for 50 feet with the least restrictive device within 7 day(s). Outcome: Progressing Towards Goal  Note:   PHYSICAL THERAPY TREATMENT  Patient: Gordy Astorga (83 y.o. female)  Date: 5/21/2021  Diagnosis: Hip fracture ([address] [place] <principal problem not specified>  Procedure(s) (LRB):  LEFT HIP HEMIARTHROPLASTY (Left) 2 Days Post-Op  Precautions: Fall, WBAT, Total hip (posterior,knee immobilizer in bed for proper positioning LLE)  Chart, physical therapy assessment, plan of care and goals were reviewed. ASSESSMENT  Patient continues with skilled PT services and is progressing towards goals. Patient with improved command following, and able to participate with therapy session today. She is still having difficulty with orientation questions and alert and oriented x2 but able to follow all commands wiith activity. She was able to transfer to sit at edge of bed with MOD A x2 for LE sequencing to maintain precautions.   She was able to perform repeat stand pivot transfer to bedside commode with MOD A and increased verbal cuing for LE sequencing and manual cues for RW management. In standing without brace, she demonstrate and increased valgus position and the Left LE and difficulty fully weight bearing with the transfers. Following toilet transfer, knee immobilizer was place on Left LE, and then she was able to take a few steps forward and side-stepping to transfer to sit up in chair. Current Level of Function Impacting Discharge (mobility/balance): MOD A x2 with RW and improved stance on the Left with knee immobilizer in place for ambulation    Other factors to consider for discharge:          PLAN :  Patient continues to benefit from skilled intervention to address the above impairments. Continue treatment per established plan of care. to address goals. Recommendation for discharge: (in order for the patient to meet his/her long term goals)  Therapy 3 hours per day 5-7 days per week    This discharge recommendation:  A follow-up discussion with the attending provider and/or case management is planned    IF patient discharges home will need the following DME: to be determined (TBD)       SUBJECTIVE:   Patient stated i feel better today.     OBJECTIVE DATA SUMMARY:   Critical Behavior:  Neurologic State: Alert  Orientation Level: Oriented to person, Oriented to place  Cognition: Decreased attention/concentration  Safety/Judgement: Awareness of environment, Decreased awareness of need for assistance, Decreased awareness of need for safety, Decreased insight into deficits, Fall prevention  Vitals:    05/21/21 1032 05/21/21 1036 05/21/21 1051 05/21/21 1123   BP: (!) 107/91 131/78 129/75 (P) 139/81   BP 1 Location: Left upper arm Left upper arm Left upper arm (P) Right upper arm   BP Patient Position: Sitting  Comment: initial sit Sitting Sitting  Comment: post activity with LEs elevated (P) Sitting   Pulse: (!) 101 (!) 104  (P) 100   Resp:    (P) 18 Temp:    (P) 98.4 °F (36.9 °C)   SpO2:   92% (P) 92%   Weight:       Height:           Functional Mobility Training:  Bed Mobility:     Supine to Sit: Moderate assistance;Assist x2  Sit to Supine: Moderate assistance;Assist x2           Transfers:  Sit to Stand: Moderate assistance;Assist x2  Stand to Sit: Moderate assistance;Assist x2        Bed to Chair: Moderate assistance;Assist x2                    Balance:     Ambulation/Gait Training:  Distance (ft): 5 Feet (ft)  Assistive Device: Walker, rolling;Gait belt;Brace/Splint  Ambulation - Level of Assistance: Moderate assistance;Assist x2     Gait Description (WDL): Exceptions to WDL  Gait Abnormalities: Decreased step clearance; Antalgic; Step to gait                    Pain Rating:  Premedicated prior    Activity Tolerance:   Fair    After treatment patient left in no apparent distress:   Sitting in chair, Call bell within reach, and Bed / chair alarm activated    COMMUNICATION/COLLABORATION:   The patients plan of care was discussed with: Occupational therapist, Registered nurse, and Case management.      Dean Heart PT, DPT   Time Calculation: 41 mins

## 2021-05-21 NOTE — PROGRESS NOTES
[facility] Dosing Services: IV to PO Conversion    The pharmacist has determined that this patient meets P & T approved criteria for conversion from IV to oral therapy for the following medication:Pantoprazole 40 mg IV daily    The pharmacist has written the following order for the patient: Pantoprazole 40 mg po daily  The pharmacist will continue to monitor the patient's status and advise the physician if conversion back to IV therapy is recommended.     Signed Gemma Stringer Contact information:  [phone]

## 2021-05-21 NOTE — PROGRESS NOTES
Cardiology Progress Note                              [address] [facility], [address] [facility]                                 Phone [phone]; Fax [phone]        2021 9:57 AM     Admit Date:           2021  Admit Diagnosis:  Hip fracture ([address] Kristy Law  :          1955   MRN:          [id number]       Impression Plan/Recommendation   Left hip fracture s/p Left hip hemiarthroplasty POD#2  Hypertension   Severe AS  Acute hfref  Hypokalemia   Confused? Echo showing pEF w severe AS  BP elevated- increase metoprolol to 100 mg, cont ccb  IV diuretics given yesterday- great response -2.5L, will give another dose now. Replete K. Looks better today, breathing comfortably  Incentive spirometer/PT/OT                 Pt personally seen and examined. Chart reviewed. Agree with advanced NP's history, exam and  A/P with changes/additons. No CP/dyspnea    Blood pressure 129/75, pulse (!) 104, temperature 98.3 °F (36.8 °C), resp. rate 19, height 5' 7.99\" (1.727 m), weight 182 lb 9.6 oz (82.8 kg), SpO2 92 %. CVS-S1-S2 present, 3/6 systolic murmur present  RS-   CTAB           A/P - L Hip Fracture - s/p ORIF           Aortic stenosis - will need further w/up - as OP. Diuresed well with IV diuretic. DC diuretic. WIll see prn over the weekend. OP appt made. Plz call if needed    Discussed with patient/nursing/family ( younger brother in the room with pt - informed him about valvular heart dz)    Fam Oconnor MD, [facility] [place]         07 - 1900  In: 120 [P.O.:120]  Out: 800 [Urine:800]    Last 3 Recorded Weights in this Encounter    21 0738 21 1426 21 0742   Weight: 160 lb 0.9 oz (72.6 kg) 160 lb (72.6 kg) 182 lb 9.6 oz (82.8 kg)         1901 -  0700  In: 1549.6 [P.O.:310; I.V.:1239.6]  Out:  [Urine:]    SUBJECTIVE Lulu Moon denies palpitations, irregular heart beat, SOB, chest pain or LE edema.   Alert to self          Current Facility-Administered Medications   Medication Dose Route Frequency    metoprolol tartrate (LOPRESSOR) tablet 50 mg  50 mg Oral Q12H    furosemide (LASIX) injection 40 mg  40 mg IntraVENous ONCE    potassium, sodium phosphates (NEUTRA-PHOS) packet 2 Packet  2 Packet Oral Q4H    ALPRAZolam (XANAX) tablet 0.5 mg  0.5 mg Oral TID PRN    levothyroxine (SYNTHROID) tablet 100 mcg  100 mcg Oral ACB    pravastatin (PRAVACHOL) tablet 40 mg  40 mg Oral DAILY    sertraline (ZOLOFT) tablet 200 mg  200 mg Oral QHS    sodium chloride (NS) flush 5-40 mL  5-40 mL IntraVENous Q8H    sodium chloride (NS) flush 5-40 mL  5-40 mL IntraVENous PRN    naloxone (NARCAN) injection 0.4 mg  0.4 mg IntraVENous PRN    calcium-vitamin D (OS-WYATT +D3) 500 mg-200 unit per tablet 1 Tablet  1 Tablet Oral TID WITH MEALS    senna-docusate (PERICOLACE) 8.6-50 mg per tablet 1 Tablet  1 Tablet Oral BID    polyethylene glycol (MIRALAX) packet 17 g  17 g Oral DAILY    bisacodyL (DULCOLAX) suppository 10 mg  10 mg Rectal DAILY PRN    acetaminophen (TYLENOL) tablet 650 mg  650 mg Oral Q6H    oxyCODONE IR (ROXICODONE) tablet 2.5 mg  2.5 mg Oral Q4H PRN    oxyCODONE IR (ROXICODONE) tablet 5 mg  5 mg Oral Q4H PRN    enoxaparin (LOVENOX) injection 40 mg  40 mg SubCUTAneous DAILY    polyethylene glycol (MIRALAX) packet 17 g  17 g Oral DAILY PRN    naloxone (NARCAN) injection 0.4 mg  0.4 mg IntraVENous EVERY 2 MINUTES AS NEEDED    amLODIPine (NORVASC) tablet 5 mg  5 mg Oral DAILY    pantoprazole (PROTONIX) 40 mg in 0.9% sodium chloride 10 mL injection  40 mg IntraVENous Q24H    hydrALAZINE (APRESOLINE) 20 mg/mL injection 20 mg  20 mg IntraVENous Q6H PRN      OBJECTIVE               Intake/Output Summary (Last 24 hours) at 5/21/2021 0859  Last data filed at 5/21/2021 0711  Gross per 24 hour   Intake 270 ml   Output 2750 ml   Net -2480 ml Review of Systems - History obtained from the patient AS PER  HPI    Telemetry NSR-ST    PHYSICAL EXAM        Visit Vitals  BP (!) 168/85 (BP 1 Location: Right upper arm, BP Patient Position: At rest)   Pulse 99   Temp 98.3 °F (36.8 °C)   Resp 19   Ht 5' 7.99\" (1.727 m)   Wt 182 lb 9.6 oz (82.8 kg)   SpO2 97%   BMI 27.77 kg/m²       Gen: Well-developed, elderly, in no acute distress  alert and oriented x 3  HEENT:  Pink conjunctivae, Hearing grossly normal.No scleral icterus or conjunctival, moist mucous membranes  Neck: Supple,No JVD  Resp: No accessory muscle use, diminished bases  Card: Regular Rate,Rythm,3/6 murmur rsb radiating to posterior, no rubs or gallop. No thrills. GI:          soft, non-tender   MSK: No cyanosis or clubbing  Skin: No rashes or ulcer  Neuro:  Cranial nerves are grossly intact, moving all four extremities, no focal deficit, follows commands appropriately  Psych:  Good insight, oriented to person, place and time, alert, Nml Affect  LE: No edema. Brace on left leg.  Left hip incision covered w CDI bandage        DATA REVIEW            Laboratory and Imaging have been reviewed by me and are notable for  Recent Labs     05/18/21  1428   TROIQ <0.05     Recent Labs     05/21/21  0715 05/20/21  0501 05/19/21  0345    140 138   K 3.0* 4.0 3.4*   CO2 32 25 28   BUN 22* 27* 14   CREA 0.56 1.06* 0.72   * 107* 122*   PHOS 1.9* 3.0 2.7   MG 2.2 2.0 2.1   WBC 9.5 10.5 12.7*   HGB 10.3* 10.6* 13.3   HCT 31.9* 31.8* 40.0    177 201             Lacy Leon NP

## 2021-05-21 NOTE — PROGRESS NOTES
Problem: Mobility Impaired (Adult and Pediatric)  Goal: *Acute Goals and Plan of Care (Insert Text)  Description:        Problem: Mobility Impaired (Adult and Pediatric)  Goal: *Acute Goals and Plan of Care (Insert Text)  Description:        Outcome: Progressing Towards Goal  Note: FUNCTIONAL STATUS PRIOR TO ADMISSION: Patient was independent and active without use of DME.    HOME SUPPORT PRIOR TO ADMISSION: The patient lived alone with no local support. Physical Therapy Goals  Initiated 5/20/2021  1. Patient will move from supine to sit and sit to supine  in bed with minimal assistance/contact guard assist within 7 day(s). 2.  Patient will transfer from bed to chair and chair to bed with minimal assistance/contact guard assist using the least restrictive device within 7 day(s). 3.  Patient will perform sit to stand with minimal assistance/contact guard assist within 7 day(s). 4.  Patient will ambulate with minimal assistance/contact guard assist for 50 feet with the least restrictive device within 7 day(s).        5/21/2021 1406 by Wisam Mazariegos, PT, DPT  Note:   PHYSICAL THERAPY TREATMENT  Patient: Sidra Barrera (3 y.o. female)  Date: 5/21/2021  Diagnosis: Hip fracture ([facility] [address] [place] <principal problem not specified>  Procedure(s) (LRB):  LEFT HIP HEMIARTHROPLASTY (Left) 2 Days Post-Op  Precautions: Fall, WBAT, Total hip (posterior,knee immobilizer in bed for proper positioning LLE)  Chart, physical therapy assessment, plan of care and goals were reviewed. ASSESSMENT  Patient continues with skilled PT services and is progressing towards goals. Patient received supine in bed, able to tolerate sitting up in chair earlier. Patient able to perform supine LE exercises. Still with confusion and incresaed LE sequencing requiring increased verbal and tactile cuing for correct performance. .     Current Level of Function Impacting Discharge (mobility/balance): MOD A x2    Other factors to consider for discharge:          PLAN :  Patient continues to benefit from skilled intervention to address the above impairments. Continue treatment per established plan of care. to address goals. Recommendation for discharge: (in order for the patient to meet his/her long term goals)  Therapy 3 hours per day 5-7 days per week    This discharge recommendation:  A follow-up discussion with the attending provider and/or case management is planned    IF patient discharges home will need the following DME: to be determined (TBD)       SUBJECTIVE:   Patient stated : patient is sleepy this afternoon    OBJECTIVE DATA SUMMARY:   Critical Behavior:  Neurologic State: Alert, Confused  Orientation Level: Oriented to person, Oriented to place, Oriented to situation, Disoriented to time  Cognition: Decreased attention/concentration, Decreased command following, Poor safety awareness  Safety/Judgement: Awareness of environment, Decreased awareness of need for assistance, Decreased awareness of need for safety, Decreased insight into deficits, Fall prevention  Functional Mobility Training:  Bed Mobility:     Supine to Sit: Moderate assistance;Assist x2  Sit to Supine: Moderate assistance;Assist x2           Transfers:  Sit to Stand: Moderate assistance;Assist x2  Stand to Sit: Moderate assistance;Assist x2        Bed to Chair: Moderate assistance;Assist x2                    Balance:  Sitting: Intact; With support  Standing: Impaired; With support  Standing - Static: Poor;Constant support  Standing - Dynamic : Poor;Constant support  Ambulation/Gait Training:  Distance (ft): 5 Feet (ft)  Assistive Device: Walker, rolling;Gait belt;Brace/Splint  Ambulation - Level of Assistance: Moderate assistance;Assist x2     Gait Description (WDL): Exceptions to WDL  Gait Abnormalities: Decreased step clearance; Antalgic; Step to gait                             Therapeutic Exercises:   Supine: 2 sets of 10  Ankle pumps, quad sets, heel slides, hip ABD  Pain Rating:  premedicated    Activity Tolerance:   Fair    After treatment patient left in no apparent distress:   Supine in bed, Call bell within reach, and Bed / chair alarm activated    COMMUNICATION/COLLABORATION:   The patients plan of care was discussed with: Registered nurse.      Braydon Lilly, PT, DPT   Time Calculation: 19 mins

## 2021-05-22 VITALS
TEMPERATURE: 98.4 F | HEART RATE: 96 BPM | WEIGHT: 167.3 LBS | HEIGHT: 68 IN | OXYGEN SATURATION: 96 % | RESPIRATION RATE: 22 BRPM | BODY MASS INDEX: 25.36 KG/M2 | SYSTOLIC BLOOD PRESSURE: 145 MMHG | DIASTOLIC BLOOD PRESSURE: 69 MMHG

## 2021-05-22 LAB
ALBUMIN SERPL-MCNC: 2.7 G/DL (ref 3.5–5)
ANION GAP SERPL CALC-SCNC: 6 MMOL/L (ref 5–15)
BUN SERPL-MCNC: 26 MG/DL (ref 6–20)
BUN/CREAT SERPL: 43 (ref 12–20)
CALCIUM SERPL-MCNC: 8.5 MG/DL (ref 8.5–10.1)
CHLORIDE SERPL-SCNC: 106 MMOL/L (ref 97–108)
CO2 SERPL-SCNC: 29 MMOL/L (ref 21–32)
COMMENT, HOLDF: NORMAL
COVID-19 RAPID TEST, COVR: NOT DETECTED
CREAT SERPL-MCNC: 0.61 MG/DL (ref 0.55–1.02)
GLUCOSE SERPL-MCNC: 102 MG/DL (ref 65–100)
MAGNESIUM SERPL-MCNC: 2.3 MG/DL (ref 1.6–2.4)
PHOSPHATE SERPL-MCNC: 2.2 MG/DL (ref 2.6–4.7)
POTASSIUM SERPL-SCNC: 3.7 MMOL/L (ref 3.5–5.1)
SAMPLES BEING HELD,HOLD: NORMAL
SODIUM SERPL-SCNC: 141 MMOL/L (ref 136–145)
SOURCE, COVRS: NORMAL

## 2021-05-22 PROCEDURE — 97116 GAIT TRAINING THERAPY: CPT

## 2021-05-22 PROCEDURE — 74011250637 HC RX REV CODE- 250/637: Performed by: PHYSICIAN ASSISTANT

## 2021-05-22 PROCEDURE — 74011250637 HC RX REV CODE- 250/637: Performed by: NURSE PRACTITIONER

## 2021-05-22 PROCEDURE — 80069 RENAL FUNCTION PANEL: CPT

## 2021-05-22 PROCEDURE — 87635 SARS-COV-2 COVID-19 AMP PRB: CPT

## 2021-05-22 PROCEDURE — 97530 THERAPEUTIC ACTIVITIES: CPT

## 2021-05-22 PROCEDURE — 36415 COLL VENOUS BLD VENIPUNCTURE: CPT

## 2021-05-22 PROCEDURE — 83735 ASSAY OF MAGNESIUM: CPT

## 2021-05-22 PROCEDURE — 94760 N-INVAS EAR/PLS OXIMETRY 1: CPT

## 2021-05-22 PROCEDURE — 94762 N-INVAS EAR/PLS OXIMTRY CONT: CPT

## 2021-05-22 PROCEDURE — 74011250636 HC RX REV CODE- 250/636: Performed by: PHYSICIAN ASSISTANT

## 2021-05-22 RX ORDER — AMOXICILLIN 250 MG
1 CAPSULE ORAL DAILY
Qty: 14 TABLET | Refills: 0 | Status: ON HOLD | OUTPATIENT
Start: 2021-05-22 | End: 2021-10-27

## 2021-05-22 RX ORDER — METOPROLOL TARTRATE 100 MG/1
100 TABLET ORAL EVERY 12 HOURS
Qty: 60 TABLET | Refills: 0 | Status: ON HOLD | OUTPATIENT
Start: 2021-05-22 | End: 2021-10-27

## 2021-05-22 RX ORDER — OXYCODONE HYDROCHLORIDE 5 MG/1
5 TABLET ORAL
Qty: 15 TABLET | Refills: 0 | Status: SHIPPED | OUTPATIENT
Start: 2021-05-22 | End: 2021-05-25

## 2021-05-22 RX ORDER — ALPRAZOLAM 0.5 MG/1
0.5 TABLET ORAL
Qty: 15 TABLET | Refills: 0 | Status: SHIPPED | OUTPATIENT
Start: 2021-05-22

## 2021-05-22 RX ORDER — POLYETHYLENE GLYCOL 3350 17 G/17G
17 POWDER, FOR SOLUTION ORAL
Qty: 7 EACH | Refills: 0 | Status: ON HOLD | OUTPATIENT
Start: 2021-05-22 | End: 2021-10-27

## 2021-05-22 RX ORDER — NALOXONE HYDROCHLORIDE 4 MG/.1ML
SPRAY NASAL
Qty: 1 EACH | Refills: 0 | Status: ON HOLD | OUTPATIENT
Start: 2021-05-22 | End: 2021-10-27

## 2021-05-22 RX ORDER — ENOXAPARIN SODIUM 100 MG/ML
40 INJECTION SUBCUTANEOUS DAILY
Qty: 10 ML | Refills: 0 | Status: SHIPPED | OUTPATIENT
Start: 2021-05-22 | End: 2021-06-16

## 2021-05-22 RX ORDER — AMLODIPINE BESYLATE 5 MG/1
10 TABLET ORAL DAILY
Qty: 30 TABLET | Refills: 0 | Status: SHIPPED | OUTPATIENT
Start: 2021-05-22

## 2021-05-22 RX ADMIN — Medication 10 ML: at 06:04

## 2021-05-22 RX ADMIN — METOPROLOL TARTRATE 100 MG: 50 TABLET, FILM COATED ORAL at 10:04

## 2021-05-22 RX ADMIN — LEVOTHYROXINE SODIUM 100 MCG: 0.1 TABLET ORAL at 06:04

## 2021-05-22 RX ADMIN — ACETAMINOPHEN 650 MG: 325 TABLET ORAL at 06:04

## 2021-05-22 RX ADMIN — ENOXAPARIN SODIUM 40 MG: 40 INJECTION SUBCUTANEOUS at 10:04

## 2021-05-22 RX ADMIN — ACETAMINOPHEN 650 MG: 325 TABLET ORAL at 12:40

## 2021-05-22 RX ADMIN — POLYETHYLENE GLYCOL 3350 17 G: 17 POWDER, FOR SOLUTION ORAL at 10:05

## 2021-05-22 RX ADMIN — Medication 1 TABLET: at 10:04

## 2021-05-22 RX ADMIN — PRAVASTATIN SODIUM 40 MG: 20 TABLET ORAL at 10:04

## 2021-05-22 RX ADMIN — Medication 1 TABLET: at 12:40

## 2021-05-22 RX ADMIN — DOCUSATE SODIUM 50MG AND SENNOSIDES 8.6MG 1 TABLET: 8.6; 5 TABLET, FILM COATED ORAL at 10:04

## 2021-05-22 NOTE — DISCHARGE INSTRUCTIONS
Future Appointments   Date Time Provider Jesus aMnuel Beard   2021  1:00 PM Oscar Rubi MD [facility]     HOSPITALIST DISCHARGE INSTRUCTIONS  NAME: Sole Leavitt   :  [birth year]   MRN:  [id number]     Date/Time:  2021 8:50 AM    ADMIT DATE: 2021     DISCHARGE DATE: 2021     PRINCIPAL DISCHARGE DIAGNOSES:  Hip fracture    MEDICATIONS:  · It is important that you take the medication exactly as they are prescribed. Note the changes and additions to your medications. Be sure you understand these changes before you are discharged today. · Keep your medication in the bottles provided by the pharmacist and keep a list of the medication names, dosages, and times to be taken in your wallet. · Do not take other medications without consulting your doctor. Pain Management: per above medications    What to do at Home    Recommended diet:  Cardiac Diet    Recommended activity: PT/OT Eval and Treat    If you experience any of the following symptoms then please call your primary care physician or return to the emergency room if you cannot get hold of your doctor:  Severe leg pain, swelling, bleeding, weakness, numbness, fever, chills, dark stools, weakness, falling, severe chest pain, shortness of breath,dizziness,  or other severe concerning symptoms    Follow Up: Follow-up Information     Follow up With Specialties Details Why Contact Info    Robert Metz MD Cardiology On 2021 1 pm [address] [facility]  [address] [facility]      Beti Boswell MD Orthopedic Surgery In 2 weeks  [address] [address]  [phone]      Nyla Smith MD Family Medicine   [address] [place] [address]  [phone]              Information obtained by :  I understand that if any problems occur once I am at home I am to contact my physician.     I understand and acknowledge receipt of the instructions indicated above.                                                                                                                                           Physician's or R.N.'s Signature                                                                  Date/Time                                                                                                                                              Patient or Representative Signature                                                          Date/Time              Discharge Instructions: How to [address]  Surgery: Hip Fracture Repair  Surgeon:   Sarah Huff MD  Surgery Date:  5/19/2021     To relieve pain:   Use ice/gel packs.  Put the ice pack directly over the wound, or anywhere you are hurting or swollen.  To control pain and swelling, keep ice on regularly, especially after physical activity.  The packs should stay cold for 3-4 hours. When it is not cold anymore, rotate with the packs in the freezer.  Elevate your leg. This will also keep swelling down.  Rest for at least 20 minutes between activity or exercises.  To keep track of your pain medications, write down what you take and when you take it.  The last dose of pain medication you got in the hospital was:     Medication    Dose    Date & Time           Choose your medications based on the pain scale below:     To keep your pain under control, take Tylenol every 6 hours for 14 days - even if you feel like you dont need it.  For mild to moderate pain (1-6 on pain scale), take one pain pill every 3-4 hours as needed.  For severe pain (7-10 on pain scale), take two pain pills every 3-4 hours as needed.  To prevent nausea, take your pain medications with food. Pain Scale                 As your pain lessens:     Slowly start taking less pain medication.  You may do this by waiting longer between doses or by taking smaller doses.  Stop using the pain medications as soon as you no longer need it, usually in 2-3 weeks.  Lovenox   To prevent blood clots, you will will need to take    Lovenox 40 mg  daily for for a total of 30 days, started 5/20 days.  It is an injection that you receive in the side of your  stomach.  Your nursing will teach you or a caregiver how to     do this before you go home.  To prevent stomach upset or bleeding:   Take Pepcid 20 mg twice a day, or a similar home medication, while you are taking a blood thinner.  Do not take non-steroidal anti-inflammatory (NSAID) medications (Ibuprofen, Advil, Motrin, Naproxen, etc.)                                                 OPSITE (Honeycomb dressing)     Keep your clear, waterproof dressing in place for 5-7 days after your leave the hospital.      If you are still having drainage, you will need to change your dressing in 5-7 days. You will be given one extra dressing to use at home.  If there is no more drainage from the wound, you may leave it open to the air. OPSITE DRESSING INSTRUCTIONS               You may shower with this dressing but do not soak it. Gently pat your wound dry when you get out of the shower.  DO NOTs:   Do not rub your surgical wound   Do not put lotion or oils on your wound.  Do not take a tub bath or go swimming until your doctor says it is ok.  You may shower with this dressing over your wound.  After showering pat the dressing dry.  If you have staples a home health nurse will remove them in about 10 days.  To increase and promote healing:     Stop Smoking (or at least cut back on       Smoking).  Eat a well-balanced diet (high in protein       and vitamin C).  If you have a poor appetite, drink Ensure, Glucerna, or CarnationInstant Breakfast for the next 30 days.      If you are diabetic, you should control your blood         sugars to prevent infection and help your wound         to heal.       To prevent constipation, stay active & drink plenty of fluid.  While using pain medications, you should also take stool softeners and laxatives, such as Pericolace and Miralax.  If you are having too many bowel movements, then you may need  to stop taking the laxatives.  You should have a bowel movement 3-4 days after surgery and then at least every other day while on pain medication.  To improve your recovery, you must be active!  WEIGHT BEARING   As Tolerated = You can put as much weight on your leg as you can tolerate while walking. With posterior hip precautions      THERAPY   If you go to a rehab facility, physical therapy (PT) will need to work with you daily, and sometimes twice a day to teach you how to:     Get in and out of the bed   Walk (gait training) and climb stairs   Do exercises and gain strength   Use a walker, crutches or cane     You may also need to have occupational therapy (OT) work with you to help you practice:     Getting on and off the toilet   Self-care (brushing teeth, eating, bathing, etc)     If you go directly home, home health physical therapy will come the day after you leave the hospital.  They will visit about 4 times over the next couple of weeks to teach you how to get out of bed, to safely walk in your home, and to do your exercises.  NO DRIVING until your surgeon tells you it is ok.  You can return to work when cleared by a physician.  Please call your physician immediately if you have:     Constant bleeding from your wound.  Increasing redness or swelling around your wound (Some warmth, bruising and swelling is normal).  Change in wound drainage (increase in amount, color, or bad smell).      Change in mental status (unusual behavior)     Temperature over 101.5 degrees [place]     Increased pain, swelling, or redness in the calf (back of your lower leg), thigh, ankle or foot.  Emergency: CALL 911 if you have:   Shortness of breath   Chest pain when you cough or taking a deep breath     Please call your surgeons office at [facility] [phone] for a follow up appointment.  You should call as soon as you get home or the next day after discharge. Ask to make an appointment in 2 weeks.

## 2021-05-22 NOTE — PROGRESS NOTES
Problem: Mobility Impaired (Adult and Pediatric)  Goal: *Acute Goals and Plan of Care (Insert Text)  Description:        Problem: Mobility Impaired (Adult and Pediatric)  Goal: *Acute Goals and Plan of Care (Insert Text)  Description:        Outcome: Progressing Towards Goal  Note: FUNCTIONAL STATUS PRIOR TO ADMISSION: Patient was independent and active without use of DME.    HOME SUPPORT PRIOR TO ADMISSION: The patient lived alone with no local support. Physical Therapy Goals  Initiated 5/20/2021  1. Patient will move from supine to sit and sit to supine  in bed with minimal assistance/contact guard assist within 7 day(s). 2.  Patient will transfer from bed to chair and chair to bed with minimal assistance/contact guard assist using the least restrictive device within 7 day(s). 3.  Patient will perform sit to stand with minimal assistance/contact guard assist within 7 day(s). 4.  Patient will ambulate with minimal assistance/contact guard assist for 50 feet with the least restrictive device within 7 day(s).        5/22/2021 1243 by Lisbet Begum PT  Outcome: Progressing Towards Goal   PHYSICAL THERAPY TREATMENT  Patient: Quiana Leary (89 y.o. female)  Date: 5/22/2021  Diagnosis: Hip fracture ([facility] [address] Chelsea Marcustrent <principal problem not specified>  Procedure(s) (LRB):  LEFT HIP HEMIARTHROPLASTY (Left) 3 Days Post-Op  Precautions: Fall, WBAT, Total hip (posterior,knee immobilizer in bed for proper positioning LLE)  Chart, physical therapy assessment, plan of care and goals were reviewed. ASSESSMENT  Patient continues with skilled PT services and is progressing towards goals. Pt received up in chair attempting to return to bed independently. Reinforced use of call bell for safety. Tolerated increased gait distance this afternoon. Progressed to 22ft with Min A-CGA. Heavy verbal cueing to proper sequencing and increasing step length on the RLE as pt tends to drag it behind throughout gait cycle.  LLE maintains in valgus position that is improved with use of immobilizer during gait attempts. Able to return to supine with Min A with pt pulling on strap of immobilizer to lift LLE back to the surface of the mattress. Pt maintained saturations >92% on RA throughout activity. Making steady gains daily. Possibly to transfer to [facility] today. Current Level of Function Impacting Discharge (mobility/balance): Min A for mobility, ambulated 22 feet    Other factors to consider for discharge: good pain control, safety awareness, was living alone PTA         PLAN :  Patient continues to benefit from skilled intervention to address the above impairments. Continue treatment per established plan of care. to address goals. Recommendation for discharge: (in order for the patient to meet his/her long term goals)  Therapy 3 hours per day 5-7 days per week    This discharge recommendation:  Has been made in collaboration with the attending provider and/or case management    IF patient discharges home will need the following DME: rolling walker       SUBJECTIVE:   Patient stated I feel much better today.     OBJECTIVE DATA SUMMARY:   Critical Behavior:  Neurologic State: Alert, Confused  Orientation Level: Oriented to person, Oriented to place, Disoriented to situation, Disoriented to time  Cognition: Follows commands, Decreased attention/concentration  Safety/Judgement: Awareness of environment, Decreased awareness of need for assistance, Decreased awareness of need for safety, Decreased insight into deficits, Fall prevention  Functional Mobility Training:  Bed Mobility:     Supine to Sit: Moderate assistance;Assist x1  Sit to Supine: Minimum assistance  Scooting: Minimum assistance        Transfers:  Sit to Stand: Minimum assistance  Stand to Sit: Minimum assistance        Bed to Chair: Minimum assistance                    Balance:  Sitting: Intact  Standing: Impaired  Standing - Static: Good;Constant support  Standing - Dynamic : Fair;Constant support  Ambulation/Gait Training:  Distance (ft): 22 Feet (ft)  Assistive Device: Brace/Splint;Gait belt;Walker, rolling  Ambulation - Level of Assistance: Minimal assistance;Contact guard assistance        Gait Abnormalities: Decreased step clearance; Antalgic; Step to gait                           Activity Tolerance:   Good and SpO2 stable on RA    After treatment patient left in no apparent distress:   Supine in bed, Call bell within reach, Bed / chair alarm activated, and Side rails x 3    COMMUNICATION/COLLABORATION:   The patients plan of care was discussed with: Registered nurse.      Bridget Prom, PT   Time Calculation: 28 mins

## 2021-05-22 NOTE — PROGRESS NOTES
CARE MANAGEMENT   DAILY PROGRESS NOTE        NAME:   Zhane Stroud   :     1955   MRN:     [id number]     RUR:  16%  Risk Level:  Low  Value-based purchasing:  No.   Bundle patient:  Yes - Specify: Ortho. Transition of care plan:  1. Pt has dc orders on chart  2. Pt is scheduled to be discharged to [address] today. Loretta Umberto states that nurse can call report to [phone] (room will be assigned when Pt arrives). Loretta Shipman asked that AVS, MAR, and dc summary be faxed to [phone]. DC summary not available, however, CM sent remaining requested information. Transportation arranged through [facility] for 1:30PM. PCS attached in AllScripts. Packet on chart. Pt aware of discharge and time. Pt voiced understanding. CM also spoke with Pt's brother, Amalia Briones ([phone]), to notify of discharge and time. Amalia Briones also voiced understanding. 3. Outpatient follow up. 4. Transportation: Ambulance [facility] at 1:30pm       _____________________________________  EVANGELIST Pope - Care Management  2021   1:33 PM    Care Management Interventions  PCP Verified by CM: Yes Val Husain MD)  Mode of Transport at Discharge:  Other (see comment) (family)  Transition of Care Consult (CM Consult): Discharge Planning  MyChart Signup: No  Discharge Durable Medical Equipment: No  Physical Therapy Consult: No  Occupational Therapy Consult: No  Speech Therapy Consult: No  Current Support Network: Own Home, Lives Alone  Confirm Follow Up Transport: Family  Discharge Location  Discharge Placement: Rehab hospital/unit acute

## 2021-05-22 NOTE — DISCHARGE SUMMARY
Physician Discharge Summary     Patient ID:  Elías Gutierrez  [id number]  75 y.o.  1955    Admit date: 5/18/2021    Discharge date: 5/22/2021    Admission Diagnoses: Hip fracture ([facility] [address] Melina Caponecock    Principal Discharge Diagnoses:    Hip fracture    OTHER PROBLEMS ADDRESSEDS  Principal Diagnosis <principal problem not specified>                                            Active Problems:    Hypothyroidism ()      Hernia, hiatal ()      HTN (hypertension) ()      HLD (hyperlipidemia) ()      Hip fracture ([facility] [address] (5/18/2021)       Patient Active Problem List   Diagnosis Code    Hypothyroidism E03.9    Hernia, hiatal K44.9    HTN (hypertension) I10    HLD (hyperlipidemia) E78.5    Hip fracture [facility]) S72.009A         Hospital Course:   Ms. Gila Coronel is a 79 yo F w/ hx of HTN, hypothyroid presenting w/ uncontrolled HTN, L hip fx s/p fall     L hip fracture: traumatic, due to mechanical fall. S/p hip hemiarthoplasty. Cont pain control, DVT ppx. PT/OT. Stable for DC    Severe aortic stenosis: symptomatic. Informed patient that she will need evaluation for AVR after she recovers from hip surgery. Cardiology following. Changed labetalol to metoprolol     Malignant HTN urgency: BP much better. Cont Norvasc and metoprolol (new meds)     Large hiatal hernia: evident on Xray.  Cont PPI     Hypothyroid: TSH high. Uncertain of compliance. FT4 WNR. Continue Synthroid at current dose      Depression/anxiety: cont Zoloft. Monitor for sedation with concomitant us of Xanax and opioid analgesics    Pt discharged in improved and stable condition. Procedures performed: see above    Imaging studies: see above  XR CHEST SNGL V    Result Date: 5/18/2021  Large hiatal hernia likely containing most of the stomach with associated left lower lobe atelectasis that could obscure other infiltrate. XR PELV AP ONLY    Result Date: 5/19/2021  There is a left hip prosthesis in gross anatomic alignment.  No evidence of orthopedic hardware complication. The osseous structures are diffusely demineralized. No new fracture is visualized. XR PELV AP ONLY    Result Date: 5/18/2021  Acute left femur neck fracture with varus angulation and foreshortening. Bones are osteopenic which could obscure a nondisplaced fracture and if there is high clinical suspicion for the fracture site, CT can be considered. XR FEMUR LT 2 V    Result Date: 5/18/2021  Acute left femur neck fracture with varus angulation and foreshortening. XR KNEE LT MAX 2 VWS    Result Date: 5/18/2021  No acute findings. Advanced osteoarthritis. Osteopenia. XR CHEST PORT    Result Date: 5/20/2021  Central pulmonary vascular congestion. Left perihilar subsegmental atelectasis    XR CHEST PORT    Result Date: 5/19/2021  Stable, large hiatal hernia. Mild right midlung atelectasis, new. PCP: Emilee Obando MD    Consults: Cardiology and Orthopedic Surgery    Discharge Exam:  Visit Vitals  BP (!) 145/69 (BP 1 Location: Right arm, BP Patient Position: At rest)   Pulse 96   Temp 98.4 °F (36.9 °C)   Resp 22   Ht 5' 7.99\" (1.727 m)   Wt 75.9 kg (167 lb 4.8 oz)   SpO2 96%   BMI 25.44 kg/m²     GEN: NAD  CV: 3/6 systolic murmur. RRR  RESP: CTAB    Disposition: SNF    Patient Instructions:   Current Discharge Medication List      START taking these medications    Details   amLODIPine (NORVASC) 5 mg tablet Take 2 Tablets by mouth daily. Qty: 30 Tablet, Refills: 0  Start date: 5/22/2021      enoxaparin (LOVENOX) 40 mg/0.4 mL 0.4 mL by SubCUTAneous route daily for 25 days. Qty: 10 mL, Refills: 0  Start date: 5/22/2021, End date: 6/16/2021      metoprolol tartrate (LOPRESSOR) 100 mg IR tablet Take 1 Tablet by mouth every twelve (12) hours. Qty: 60 Tablet, Refills: 0  Start date: 5/22/2021      oxyCODONE IR (ROXICODONE) 5 mg immediate release tablet Take 1 Tablet by mouth every six (6) hours as needed for Pain for up to 3 days.  Max Daily Amount: 20 mg.  Qty: 15 Tablet, Refills: 0  Start date: 5/22/2021, End date: 5/25/2021    Associated Diagnoses: Closed fracture of left hip, initial encounter ([facility] [address]      polyethylene glycol (MIRALAX) 17 gram packet Take 1 Packet by mouth daily as needed for Constipation. Qty: 7 Each, Refills: 0  Start date: 5/22/2021      senna-docusate (PERICOLACE) 8.6-50 mg per tablet Take 1 Tablet by mouth daily. Qty: 14 Tablet, Refills: 0  Start date: 5/22/2021      naloxone (Narcan) 4 mg/actuation nasal spray Use 1 spray intranasally, then discard. Repeat with new spray every 2 min as needed for opioid overdose symptoms, alternating nostrils. Qty: 1 Each, Refills: 0  Start date: 5/22/2021         CONTINUE these medications which have CHANGED    Details   ALPRAZolam (XANAX) 0.5 mg tablet Take 1 Tablet by mouth three (3) times daily as needed for Anxiety. Max Daily Amount: 1.5 mg. Indications: anxious  Qty: 15 Tablet, Refills: 0  Start date: 5/22/2021    Associated Diagnoses: Anxiety         CONTINUE these medications which have NOT CHANGED    Details   clomiPRAMINE (ANAFRANIL) 50 mg capsule Take 100 mg by mouth nightly. levothyroxine (SYNTHROID) 200 mcg tablet Take 100 mcg by mouth Daily (before breakfast). pravastatin (PRAVACHOL) 40 mg tablet Take 40 mg by mouth daily. sertraline (ZOLOFT) 100 mg tablet Take 200 mg by mouth nightly. Activity: See discharge instructions  Diet: See discharge instructions  Wound Care: See discharge instructions    Follow-up Information     Follow up With Specialties Details Why Contact Info    Jomar Osborne MD Cardiology On 6/11/2021 1 pm [address] [facility]  [phone]      James Mack MD Orthopedic Surgery In 2 weeks  [address]   [address] [place] [address]  [phone]      Irving Pak MD Family Medicine   [address] [place] [address] [address]  [address] [facility]  [phone]            I spent 35 minutes on this discharge.     Signed:  Yesica Mathews MD  5/22/2021  8:51 AM    CC: PCP Dr. Crow Chu   .

## 2021-05-22 NOTE — PROGRESS NOTES
Bedside and Verbal shift change report given to Luiza Kang RN (oncoming nurse) by Cortes Garner RN (offgoing nurse). Report included the following information SBAR, Kardex, ED Summary, MAR, Recent Results and Cardiac Rhythm SR. This patient was assisted with Intentional Toileting every 2 hours during this shift. Documentation of ambulation and output reflected on Flowsheet. [address] and [employer] and gave report to MICAELA Garcia    I have reviewed discharge instructions with the patient. The patient verbalized understanding.

## 2021-05-22 NOTE — PROGRESS NOTES
Subjective:    Bala Marie is a 78 y.o. female who is POD 3 s/p LEFT hip bipolar    Major Events:. Pain controlled    Objective:    Vital signs in last 24 hours:    Temp:  [97 °F (36.1 °C)-98.8 °F (37.1 °C)]   Pulse (Heart Rate):  []   BP: ()/()   Resp Rate:  [12-27]   O2 Sat (%):  [84 %-99 %]   Weight:  [72.6 kg (160 lb)-82.8 kg (182 lb 9.6 oz)]     Temp (24hrs), Av °F (36.7 °C), Min:97.3 °F (36.3 °C), Max:98.4 °F (36.9 °C)      Labs:    Lab Results   Component Value Date/Time    WBC 9.5 2021 07:15 AM    HGB 10.3 (L) 2021 07:15 AM    HCT 31.9 (L) 2021 07:15 AM    PLATELET 830  07:15 AM       Lab Results   Component Value Date/Time    Sodium 141 2021 03:30 AM    Potassium 3.7 2021 03:30 AM    Chloride 106 2021 03:30 AM    CO2 29 2021 03:30 AM    BUN 26 (H) 2021 03:30 AM       Physical Exam:    General: alert, cooperative, in NAD  Nonlabored resp    LEFT Lower extremity    Dressing: c/d/i  Knee immobilizer in place      Motor: + ankle df/pf    Sensory: SILT    Vascular: Brisk capillary refill in toes    Assessment/Plan:    · Pain management: as written    · DVT Prophylaxis: Lovenox x 1 month    · PT/OT: WBAT   Knee immobilizer in place while in house    · Dispo: To SNF/LTC    F/u: with Dr. Hermilo Estrada team as scheduled  [place].  Jason Hernandez MD

## 2021-05-22 NOTE — PROGRESS NOTES
Problem: Mobility Impaired (Adult and Pediatric)  Goal: *Acute Goals and Plan of Care (Insert Text)  Description:        Problem: Mobility Impaired (Adult and Pediatric)  Goal: *Acute Goals and Plan of Care (Insert Text)  Description:        Outcome: Progressing Towards Goal  Note: FUNCTIONAL STATUS PRIOR TO ADMISSION: Patient was independent and active without use of DME.    HOME SUPPORT PRIOR TO ADMISSION: The patient lived alone with no local support. Physical Therapy Goals  Initiated 5/20/2021  1. Patient will move from supine to sit and sit to supine  in bed with minimal assistance/contact guard assist within 7 day(s). 2.  Patient will transfer from bed to chair and chair to bed with minimal assistance/contact guard assist using the least restrictive device within 7 day(s). 3.  Patient will perform sit to stand with minimal assistance/contact guard assist within 7 day(s). 4.  Patient will ambulate with minimal assistance/contact guard assist for 50 feet with the least restrictive device within 7 day(s). Outcome: Progressing Towards Goal   PHYSICAL THERAPY TREATMENT  Patient: Sam Sweet (36 y.o. female)  Date: 5/22/2021  Diagnosis: Hip fracture ([address] Zoltan Sat <principal problem not specified>  Procedure(s) (LRB):  LEFT HIP HEMIARTHROPLASTY (Left) 3 Days Post-Op  Precautions: Fall, WBAT, Total hip (posterior,knee immobilizer in bed for proper positioning LLE)  Chart, physical therapy assessment, plan of care and goals were reviewed. ASSESSMENT  Patient continues with skilled PT services and is progressing towards goals. Overall improvement in strength, balance and activity tolerance this morning. Received supine in bed, incontinent of urine and having slid to foot of the bed. Pt requires Mod A and several tries to achieve sitting EOB. Improves with verbal cueing and additional time for processing. Intact balance once sitting upright.  Pulling up on RW to stand with Min A x 1 and ambulates 5ft to recliner chair. Deferred further gait training d/t elevated BP. Performed seated ADLs and set up with breakfast tray. All needs in reach and chair alarm activated. Will progress mobility at pt tolerates. [facility] remains [facility]. Current Level of Function Impacting Discharge (mobility/balance): Min A x 1 for standing, Mod A bed mobility    Other factors to consider for discharge: high fall risk, lives alone         PLAN :  Patient continues to benefit from skilled intervention to address the above impairments. Continue treatment per established plan of care. to address goals. Recommendation for discharge: (in order for the patient to meet his/her long term goals)  Therapy 3 hours per day 5-7 days per week    This discharge recommendation:  Has been made in collaboration with the attending provider and/or case management    IF patient discharges home will need the following DME: rolling walker       SUBJECTIVE:   Patient stated I must have been dreaming.     OBJECTIVE DATA SUMMARY:   Critical Behavior:  Neurologic State: Alert, Confused  Orientation Level: Oriented to person, Oriented to place, Disoriented to situation, Disoriented to time  Cognition: Follows commands, Decreased attention/concentration  Safety/Judgement: Awareness of environment, Decreased awareness of need for assistance, Decreased awareness of need for safety, Decreased insight into deficits, Fall prevention  Functional Mobility Training:  Bed Mobility:     Supine to Sit: Moderate assistance;Assist x1     Scooting:  Moderate assistance        Transfers:  Sit to Stand: Minimum assistance;Assist x1;Additional time  Stand to Sit: Minimum assistance;Assist x1;Additional time        Bed to Chair: Minimum assistance;Assist x1;Additional time                    Balance:  Sitting: Intact  Standing: Impaired  Standing - Static: Fair;Constant support  Standing - Dynamic : Fair;Constant support  Ambulation/Gait Training:  Distance (ft): 5 Feet (ft)  Assistive Device: Brace/Splint;Gait belt;Walker, rolling  Ambulation - Level of Assistance: Minimal assistance;Assist x1        Gait Abnormalities: Decreased step clearance; Antalgic; Step to gait                                      Stairs: Therapeutic Exercises: Ankle pumps, quad sets  Pain Rating:  none    Activity Tolerance:   Good    After treatment patient left in no apparent distress:   Sitting in chair, Heels elevated for pressure relief, Call bell within reach and Bed / chair alarm activated    COMMUNICATION/COLLABORATION:   The patients plan of care was discussed with: Registered nurse.      Raoul Steele, PT   Time Calculation: 25 mins

## 2021-05-26 NOTE — PROGRESS NOTES
Physician Progress Note      PATIENT:               Dorcas Lopze  CSN #:                  [id number]  :                       1955  ADMIT DATE:       2021 1:04 PM  Hina Rai [place] DATE:        2021 1:51 PM  RESPONDING  PROVIDER #:        Ina Rabago MD          QUERY TEXT:    Dear Hospitalist Team,  Pt admitted with an acute left femur fracture s/p GLF. It's noted documentation of Acute systolic CHF on  progress note by ordered Cardiology consultant. If possible, please document in progress notes and discharge summary:      The medical record reflects the following:    Risk Factors: 78 Yr F admitted with a left femur fracture s/p GLF    Clinical Indicators: Patient arrives to the ED via EMS after a GLF at home. Work up in the ED revealed a left femur fracture in which the patient underwent a left hemiarthroplasty on . Per cardiology progress note on , whom was consulted for hypertension and cardiac clearance for surgery on admission, states Acute hfref. IV diuretics given yesterday- great response -2.5L, will give another dose now. Replete K. Looks better today, breathing comfortably. CXR on  revealed Central pulmonary vascular congestion. Left perihilar subsegmental atelectasis. Patient received one time dose Lasix 40mg IV on  with subsequent one time dose of Lasix 20 mg IV on . Treatment: Cardiology consult, CXR, frequent monitoring/vital signs and Lasix 40mg IV on  with subsequent one time dose of Lasix 20 mg IV on . Thank you,  Daljit Hawkins RN, [place], [address] [place] [address]  Options provided:  -- Acute Systolic CHF confirmed not present on admission  -- Acute Systolic CHF ruled out  -- Other - I will add my own diagnosis  -- Disagree - Not applicable / Not valid  -- Disagree - Clinically unable to determine / Unknown  -- Refer to Clinical Documentation Reviewer    PROVIDER RESPONSE TEXT:    Provider disagreed with this query. Query created by:  Gerald Kaya Morataya on 5/24/2021 10:10 AM      Electronically signed by:  Olivia Henderson MD 5/25/2021 8:16 PM

## 2021-10-27 ENCOUNTER — HOSPITAL ENCOUNTER (OUTPATIENT)
Age: 66
Setting detail: OUTPATIENT SURGERY
Discharge: HOME | End: 2021-10-27
Attending: SPECIALIST | Admitting: SPECIALIST
Payer: MEDICARE

## 2021-10-27 VITALS
HEIGHT: 67 IN | WEIGHT: 153.1 LBS | HEART RATE: 103 BPM | BODY MASS INDEX: 24.03 KG/M2 | TEMPERATURE: 98.2 F | SYSTOLIC BLOOD PRESSURE: 130 MMHG | RESPIRATION RATE: 23 BRPM | DIASTOLIC BLOOD PRESSURE: 69 MMHG | OXYGEN SATURATION: 96 %

## 2021-10-27 DIAGNOSIS — I35.0 AORTIC VALVE STENOSIS, ETIOLOGY OF CARDIAC VALVE DISEASE UNSPECIFIED: ICD-10-CM

## 2021-10-27 LAB — ACT BLD: 131 SECS (ref 79–138)

## 2021-10-27 PROCEDURE — 77030008543 HC TBNG MON PRSS MRTM -A: Performed by: SPECIALIST

## 2021-10-27 PROCEDURE — 93454 CORONARY ARTERY ANGIO S&I: CPT | Performed by: SPECIALIST

## 2021-10-27 PROCEDURE — 2709999900 HC NON-CHARGEABLE SUPPLY: Performed by: SPECIALIST

## 2021-10-27 PROCEDURE — C1894 INTRO/SHEATH, NON-LASER: HCPCS | Performed by: SPECIALIST

## 2021-10-27 PROCEDURE — 74011250636 HC RX REV CODE- 250/636: Performed by: SPECIALIST

## 2021-10-27 PROCEDURE — 77030029065 HC DRSG HEMO QCLOT ZMED -B

## 2021-10-27 PROCEDURE — 99152 MOD SED SAME PHYS/QHP 5/>YRS: CPT | Performed by: SPECIALIST

## 2021-10-27 PROCEDURE — 85347 COAGULATION TIME ACTIVATED: CPT

## 2021-10-27 PROCEDURE — 77030004532 HC CATH ANGI DX IMP BSC -A: Performed by: SPECIALIST

## 2021-10-27 PROCEDURE — C1769 GUIDE WIRE: HCPCS | Performed by: SPECIALIST

## 2021-10-27 PROCEDURE — 93571 IV DOP VEL&/PRESS C FLO 1ST: CPT | Performed by: SPECIALIST

## 2021-10-27 PROCEDURE — 77030004522 HC CATH ANGI DX EXPO BSC -A: Performed by: SPECIALIST

## 2021-10-27 PROCEDURE — 77030003390 HC NDL ANGI MRTM -A: Performed by: SPECIALIST

## 2021-10-27 PROCEDURE — C1887 CATHETER, GUIDING: HCPCS | Performed by: SPECIALIST

## 2021-10-27 PROCEDURE — 99153 MOD SED SAME PHYS/QHP EA: CPT | Performed by: SPECIALIST

## 2021-10-27 PROCEDURE — 74011000636 HC RX REV CODE- 636: Performed by: SPECIALIST

## 2021-10-27 PROCEDURE — 74011000250 HC RX REV CODE- 250: Performed by: SPECIALIST

## 2021-10-27 RX ORDER — LIDOCAINE HYDROCHLORIDE 10 MG/ML
INJECTION INFILTRATION; PERINEURAL AS NEEDED
Status: DISCONTINUED | OUTPATIENT
Start: 2021-10-27 | End: 2021-10-27 | Stop reason: HOSPADM

## 2021-10-27 RX ORDER — SODIUM CHLORIDE 9 MG/ML
INJECTION, SOLUTION INTRAVENOUS
Status: COMPLETED | OUTPATIENT
Start: 2021-10-27 | End: 2021-10-27

## 2021-10-27 RX ORDER — MIDAZOLAM HYDROCHLORIDE 1 MG/ML
INJECTION, SOLUTION INTRAMUSCULAR; INTRAVENOUS AS NEEDED
Status: DISCONTINUED | OUTPATIENT
Start: 2021-10-27 | End: 2021-10-27 | Stop reason: HOSPADM

## 2021-10-27 RX ORDER — SODIUM CHLORIDE 9 MG/ML
125 INJECTION, SOLUTION INTRAVENOUS CONTINUOUS
Status: DISPENSED | OUTPATIENT
Start: 2021-10-27 | End: 2021-10-27

## 2021-10-27 RX ORDER — CARVEDILOL 6.25 MG/1
6.25 TABLET ORAL 2 TIMES DAILY WITH MEALS
COMMUNITY

## 2021-10-27 RX ORDER — FENTANYL CITRATE 50 UG/ML
INJECTION, SOLUTION INTRAMUSCULAR; INTRAVENOUS AS NEEDED
Status: DISCONTINUED | OUTPATIENT
Start: 2021-10-27 | End: 2021-10-27 | Stop reason: HOSPADM

## 2021-10-27 RX ORDER — HEPARIN SODIUM 200 [USP'U]/100ML
INJECTION, SOLUTION INTRAVENOUS
Status: COMPLETED | OUTPATIENT
Start: 2021-10-27 | End: 2021-10-27

## 2021-10-27 RX ORDER — SODIUM CHLORIDE 9 MG/ML
75 INJECTION, SOLUTION INTRAVENOUS ONCE
Status: COMPLETED | OUTPATIENT
Start: 2021-10-27 | End: 2021-10-27

## 2021-10-27 RX ORDER — SODIUM CHLORIDE 0.9 % (FLUSH) 0.9 %
5-40 SYRINGE (ML) INJECTION AS NEEDED
Status: DISCONTINUED | OUTPATIENT
Start: 2021-10-27 | End: 2021-10-27 | Stop reason: HOSPADM

## 2021-10-27 RX ORDER — ASPIRIN 325 MG
325 TABLET ORAL ONCE
Status: DISCONTINUED | OUTPATIENT
Start: 2021-10-27 | End: 2021-10-27 | Stop reason: HOSPADM

## 2021-10-27 RX ORDER — ACETAMINOPHEN 325 MG/1
325 TABLET ORAL
COMMUNITY

## 2021-10-27 RX ORDER — DIPHENHYDRAMINE HYDROCHLORIDE 50 MG/ML
25 INJECTION, SOLUTION INTRAMUSCULAR; INTRAVENOUS
Status: DISCONTINUED | OUTPATIENT
Start: 2021-10-27 | End: 2021-10-27 | Stop reason: HOSPADM

## 2021-10-27 RX ORDER — HEPARIN SODIUM 1000 [USP'U]/ML
INJECTION, SOLUTION INTRAVENOUS; SUBCUTANEOUS AS NEEDED
Status: DISCONTINUED | OUTPATIENT
Start: 2021-10-27 | End: 2021-10-27 | Stop reason: HOSPADM

## 2021-10-27 RX ORDER — HYDROCORTISONE SODIUM SUCCINATE 100 MG/2ML
100 INJECTION, POWDER, FOR SOLUTION INTRAMUSCULAR; INTRAVENOUS
Status: DISCONTINUED | OUTPATIENT
Start: 2021-10-27 | End: 2021-10-27 | Stop reason: HOSPADM

## 2021-10-27 RX ORDER — ASPIRIN 81 MG/1
81 TABLET ORAL DAILY
COMMUNITY

## 2021-10-27 RX ORDER — SODIUM CHLORIDE 0.9 % (FLUSH) 0.9 %
5-40 SYRINGE (ML) INJECTION EVERY 8 HOURS
Status: DISCONTINUED | OUTPATIENT
Start: 2021-10-27 | End: 2021-10-27 | Stop reason: HOSPADM

## 2021-10-27 RX ADMIN — SODIUM CHLORIDE 125 ML/HR: 9 INJECTION, SOLUTION INTRAVENOUS at 12:20

## 2021-10-27 RX ADMIN — SODIUM CHLORIDE 75 ML/HR: 9 INJECTION, SOLUTION INTRAVENOUS at 09:52

## 2021-10-27 NOTE — Clinical Note
TRANSFER - OUT REPORT:     Verbal report given to: (at bedside). Report consisted of patient's Situation, Background, Assessment and   Recommendations(SBAR). Opportunity for questions and clarification was provided. Patient transported with a Registered Nurse and [address] [place] [address] / [facility]. Patient transported to: recovery.

## 2021-10-27 NOTE — PROGRESS NOTES
TRANSFER - IN REPORT:    Verbal report received from [address] [place] [address] (name) on Bill Mitchell  being received from cath lab(unit) for routine progression of care      Report consisted of patients Situation, Background, Assessment and   Recommendations(SBAR). Information from the following report(s) Procedure Summary was reviewed with the receiving nurse. Opportunity for questions and clarification was provided. Assessment completed upon patients arrival to unit and care assumed. 1210: Patient received from cath lab. Patient with sheath and tegederm to right groin. Site clean, dry and intact. No hematoma. Pulses palpable. VS stable. Patient with no complaints at this time. HOB flat    2485: Blood aspirated from sheath then right groin sheath pulled 6 Fr right Groin. Nitin Joshua applied. Manual pressure held by Yahaira HINOJOSA.     4254: Release pressure. Patient with oozing. Pressure held by Rich Fritz    7287: Hemostasis achieved at 66 135 36 14. Dressing applied. Pt voices understanding of post procedure bedrest instructions. 1250: Discharge instructions and prescriptions reviewed with patient's brother. Opportunity provided for questions. Brother verbalized understanding. 1327: Patient feels she needs to void. Patient assisted on to bedpan. Patient with small hematoma. Pressure held for 8 minutes. Site now soft. Small amount of bruising noted at the site. Sandbag applied    1445: Patient HOB elevated 30 degrees Patient with quikclot to right groin. Site clean, dry and intact. Small amount of bruising noted. No hematoma. Pulses palpable. VS stable. Patient with no complaints. Assisted with lunch tray. 1515:  Patient HOB elevated 45 degrees Patient with quikclot to right groin. Site clean, dry and intact. Small amount of bruising noted. No hematoma, site soft. Pulses palpable. VS stable. Patient with no complaints. 1520: Discharge instructions and prescriptions reviewed with patient.  Opportunity provided for questions. Patient verbalized understanding. Signed copy of discharge placed in the front of patient's chart. 1540: Patient dangled on the side of the bed. No complaints at this time. 1543: Patient ambulated to the bathroom. Voided. Gait steady. No complaints. 1550: IV and tele removed. 1600: Patient escorted via wheelchair to entrance. Patient brother driving. Patient discharged into care of brother.

## 2021-10-27 NOTE — Clinical Note
Sheath #1: Sheath: left in place. Site secured by Tegaderm and transparent dressing. Hemostasis achieved.

## 2021-10-27 NOTE — Clinical Note
TRANSFER - IN REPORT:     Verbal report received from: [employer]. Report consisted of patient's Situation, Background, Assessment and   Recommendations(SBAR). Opportunity for questions and clarification was provided. Assessment completed upon patient's arrival to unit and care assumed. Patient transported with a Registered Nurse.

## 2021-10-27 NOTE — PROCEDURES
Cath:  Mild (non-obstructive) CAD     LAD patent     LCx patent; OM1 ost 40 (iFR 0.97, not significant)     RCA patent (high, anterior origin).   RFA manual    F/U with Dr. López Gruber 11/3/21 @ 1pm

## 2021-10-27 NOTE — PROGRESS NOTES
TRANSFER - OUT REPORT:    Verbal report given to [facility]) on Arva Heimlich  being transferred to cath lab(unit) for ordered procedure       Report consisted of patients Situation, Background, Assessment and   Recommendations(SBAR). Information from the following report(s) SBAR was reviewed with the receiving nurse. Lines:   Peripheral IV 10/27/21 Left Forearm (Active)   Site Assessment Clean, dry, & intact 10/27/21 0951   Phlebitis Assessment 0 10/27/21 0951   Infiltration Assessment 0 10/27/21 0951   Dressing Status Clean, dry, & intact 10/27/21 0951   Dressing Type Transparent 10/27/21 0951   Hub Color/Line Status Pink;Flushed; Infusing 10/27/21 0951        Opportunity for questions and clarification was provided.       Patient transported with:   Registered Nurse

## 2021-10-27 NOTE — DISCHARGE INSTRUCTIONS
Discharge Instructions:                    Cardiac Catheterization/Angiography Discharge Instructions    *Check the puncture site frequently for swelling or bleeding. If you see any bleeding, lie down and apply pressure over the area and call 911. Notify your doctor for any redness, swelling, drainage or oozing from the puncture site. Notify your doctor for any fever or chills. *If the leg or arm with the puncture becomes cold, numb or painful, call Dr Rojelio Alanis at  [phone]    *Activity should be limited for the next 48 hours. Climb stairs as little as possible and avoid any stooping, bending or strenuous activity for 48 hours. No heavy lifting (anything over 10 pounds) for five days. *Do not drive for 24 hours. *You may resume your usual diet. Drink more fluids than usual.    *Have a responsible person drive you home and stay with you for at least 24 hours after your heart catheterization/angiography. *You may remove the bandage from your groin in 24 hours. You may shower in 24 hours. No tub baths, hot tubs or swimming for one week. Do not place any lotions, creams, powders, ointments over the puncture site for one week. You may place a clean band-aid over the puncture site each day for 5 days. Change this daily. Medications: Take medications as prescribed      Activity:     As tolerated except do not lift over 10 pounds for 5 days. Diet:     American Heart Association. Follow-up:     Follow up with Chantal Addison MD on November 3rd, 2021 at [address] [place] [address]  [phone]      If you smoke, STOP!

## 2021-10-27 NOTE — H&P
Date of Surgery Update:  Diana Olivera was seen and examined. History and physical has been reviewed. The patient has been examined. There have been no significant clinical changes since the completion of the originally dated History and Physical.    Signed By: Yeimi Adan MD     October 27, 2021 11:22 AM       Severe AS, Pre-TAVR  Echo (10/22/21):  EF 45% with sept/lat/apical/inf HK. AS (PG 98, MG 70, AUSTIN 0.5)        Gretta Hamzah 1955   Office/Outpatient Visit  Visit Date: Fri, Oct 22, 2021 11:40 am  Provider: Patt Fajardo MD (Assistant: Justino Ballesteros LPN )  Location: [facility])[address] [place]. [address] [phone]    Electronically signed by Yue Damon MD on  10/22/2021 02:07:03 PM                           Subjective:    CC: Mrs. Gretchen Saldaña is a 78year old White female. Her primary care physician is Brooks Ulloa MD.  This is a 1 week follow-up visit. Since her last visit, she has had the following testing: echocardiogram (Echocardiogram). Patient verbalized medications unchanged since last office visit. The primary reason for today's visit is evaluation of the following: shortness of breath. She has a history of hypercholesterolemia, hypertension, and aortic stenosis. HPI:           Hypercholesterolemia: Current treatment includes Pravachol and Aspirin 81 mg and diet. Regarding hypertension:  Type Primary Hypertension Currently, her treatment regimen consists of Pravachol and Norvasc. Heart Valves: Aortic Stenosis: Aortic stenosis is severe. Associated symptoms include dyspnea. She denies chest pain, syncope or ankle swelling. To evaluate for aortic stenosis, the patient has had a prior echocardiogram ( performed 10/22/21; official interpretation shows Mildly decreased LV systolic function. with an estimated ejection fraction of 45%.   Wall motion abnormalities include: septal hypokinesis,  lateral hypokinesis,  apical hypokinesis and inferior hypokinesis. There is mild left ventricular hypertrophy. The left atrium is moderately enlarged. There is trace aortic regurgitation. There is mild to moderate mitral regurgitation. There is moderate tricuspid regurgitation. with moderate pulmonary hypertension. Severe aortic stenosis with a calculated aortic valve area of 0.5 cm2 with a peak gradient of 98 mmHg and a mean gradient of 70 mmHg. ) and Carotid - 6/28/21 - Mild plaques involving the carotid system. 15 - 49% stenosis of the right internal carotid artery. 15 - 49% stenosis of the left internal carotid artery. Antegrade vertebral artery flow bilaterally. Negative Myoview 6/25/21. .            Regarding dyspnea/shortness of breath: This tends to be worse with exertion. The shortness of breath is better rest.    Past Medical History / Family History / Social History:     Last Reviewed on 10/22/2021 01:01 PM by Memo Mehta  Past Medical History:     Hypercholesterolemia  Hypertension   Hyperthyroidism Obsessive-compulsive disorder   COVID-19 Vaccine: Rene Peter X2     Past Cardiac Procedures:  Echocardiogram:  6/25/21 -  Normal LV systolic function with an estimated ejection fraction of 55%. There is moderate left ventricular hypertrophy. There is mild mitral regurgitation. There is mild to moderate tricuspid regurgitation. Moderate aortic stenosis with a calculated aortic valve area of 0.8 cm2 with a peak gradient of 59 mmHg and a mean gradient of 41 mmHg. Nuclear Study:  6/25/21 - Normal myocardial perfusion Tetrofosmin Myoview SPECT imaging. Calculated left ventricular ejection fraction was normal at 55%. Carotid Study:  6/28/21 - Mild plaques involving the carotid system. 15 - 49% stenosis of the right internal carotid artery. 15 - 49% stenosis of the left internal carotid artery. Antegrade vertebral artery flow bilaterally.       Surgical History:   Surgical/Procedural History: Tonsillectomy  Hip Surgery     Family History:   Father: ;  coronary artery disease   Mother: Healthy;      Social History:   Social History:   Occupation: Retired     Tobacco/Alcohol/Supplements:   Last Reviewed on 10/22/2021 01:01 PM by Tenebril  TOBACCO/ALCOHOL/SUPPLEMENTS   Tobacco: She has never smoked. Alcohol: Non-drinker   Caffeine:  She admits to consuming caffeine via soda ( 1 liter servings per day ). Substance Abuse History:   Last Reviewed on 10/22/2021 01:01 PM by [address] History:   Last Reviewed on 10/22/2021 01:01 PM by Tenebril    Communicable Diseases (eg STDs): Last Reviewed on 10/22/2021 01:01 PM by Muir Vanna    Current Problems:   Last Reviewed on 10/22/2021 01:01 PM by Muir Vanna  Nonrheumatic aortic (valve) stenosis  Shortness of breath  Other specified symptoms and signs involving the circulatory and respiratory systems  Pure hypercholesterolemia  Pure hypercholesterolemia  Essential (primary) hypertension  Pure hyperglyceridemia  Shortness of breath  Hypercholesterolemia  Essential hypertension, benign  Aortic valve disorder  Shortness of Breath    Allergies:   Last Reviewed on 10/22/2021 01:01 PM by Muir Vanna  No Known Allergies.     Current Medications:   Last Reviewed on 10/22/2021 01:01 PM by Muir Vanna  senna  [as needed]  ALPRAZolam 0.5 mg oral tablet [take 1 tablet (0.5 mg) by oral route 3 times per day]  clomiPRAMINE 50 mg oral capsule [take 1 capsule (50 mg) at night]  levothyroxine 200 mcg oral tablet [take 1 tablet (200 mcg) by oral route once daily]  pravastatin 40 mg oral tablet [take 1 tablet (40 mg) by oral route once daily]  sertraline 100 mg oral tablet [take 1 tablet (100 mg) by oral route once daily]  acetaminophen  [as needed]  amLODIPine 10 mg oral tablet [take 1 tablet (10 mg) by oral route once daily]    Objective:    Vitals:     Historical:   10/13/2021  BP:   154/115 mm Hg ( (left arm, , sitting, );) 10/13/2021  BP:   168/112 mm Hg ( (left arm, , standing, );) 10/13/2021  BP:   171/120 mm Hg ( (left arm, , sitting, );) 10/13/2021  Wt:   160lbs  Current: 10/22/2021 1:01:41 PM  Ht:  5 ft, 7 in; Wt: 161 lbs;  BMI: 25. 2BP: 172/100 mm Hg (left arm, sitting)    Repeat:   1:1:50 PM  BP:   168/100mm Hg (left arm, standing)   Exams:   GENERAL:  Alert, oriented to person, place and time. HEENT:  Pinkish palpebral  conjunctivae. Anicteric sclerae. Neck: Bilateral carotid bruit   CHEST: Equal expansion. Clear breath sounds. No rales, no wheezing. Heart: Reg rate and rhythm. Grade 3/6 systolic ejection murmur at the aortic area radiating to the neck radiating to the precordium. ABDOMEN:  Soft. Normal active bowel sounds. No tenderness. EXTREMITIES:  No pitting pedal edema. Equal pulses bilaterally. NEURO:  Grossly intact. Procedures:   Pure hypercholesterolemia    ECG INTERPRETATION: See scanned EKG for results. Assessment:     E78.0   Pure hypercholesterolemia     I10   Essential (primary) hypertension     I35.0   Nonrheumatic aortic (valve) stenosis     I35.0   Nonrheumatic aortic (valve) stenosis     E78.00   Pure hypercholesterolemia     I10   Essential (primary) hypertension     R06.02   Shortness of breath       ORDERS:     Procedures Ordered:     09169  Electrocardiogram, routine with at least 12 leads; with interpretation and report  (In-House)          52119  Education and train for pt self-mgmt by qualified, nonphysician, al 30 minutes; individual pt  (Send-Out)          XCATH  Cardiac Cath  (In-House)          [facility]  General Surgeon Referral  (Send-Out)          Other Orders:     [id number]  Queried Patient for Tobacco Use  (Send-Out)              Plan:     Pure hypercholesterolemia      Orders:     52193  Electrocardiogram, routine with at least 12 leads; with interpretation and report  (In-House)          Nonrheumatic aortic (valve) stenosis  1.   Medication list has been reviewed. Start the following medication(s): Baby Aspirin 81 mg. take one tablet once daily with food and Carvedilol. Smoking Status:  Nonsmoker   2. Advised the patient regarding diet, exercise, and lifestyle modification. 3.  The patient to call the office if there is any change in her cardiac symptoms. 4.  Explained to the patient the importance of controlling her cardiac risk factors. Testing/Procedures: Cardiac Catheterization  Explained to the patient the indication, procedure, risks, and benefits of cardiac catheterization. The patient understands  and wishes to proceed with the cath to be performed as an outpatient at [facility] by Dr. Rafael Garcia. Schedule a follow up appointment in 2 weeks. Referrals:  I am referring Mrs. Saima Slade to a cardiac surgeon Doug Zepeda MD.    Referral for aortic stenosis and possible surgergy vs. TAVR. I also recommend that you monitor your BP. Target BP less than 130/80. The above note was transcribed by Olga Cali and authenticated by Dr. Jamie Rose prior to sign off.

## 2024-06-14 NOTE — PROGRESS NOTES
1:25pm:  CM s/w Rozina Jones, admissions at [facility]. Patient has been accepted and they anticipate admission for tomorrow. Danika Carlson LCSW    Transition of Care Plan - RUR 18% (low risk):  1. CM following- referrals placed to [facility] and SNF on 5/20; patient has been accepted by [address] [facility]. [facility] referral is still under review, await response. 2. Pt lives alone at home  3. S/P Left hip hemiarthroplasty POD#2  4. Cardiology following  5.  [facility] transport at discharge - placed on will call for 5/22/21    Danika Carlson LCSW Please let the patient know that results did show kidney stones on the left side they are nonobstructive nonetheless given her symptomatology and the absence of a clear infection if she is still having symptoms she will need to see urology please check with the patient and let me know

## (undated) DEVICE — SUTURE STRATAFIX SPRL SZ 1 L14IN ABSRB VLT L48CM CTX 1/2 SXPD2B405

## (undated) DEVICE — TELFA ADHESIVE ISLAND DRESSING: Brand: TELFA

## (undated) DEVICE — PILLOW POS W15XH6XL22IN RASPBERRY FOAM ABD W/ STRP DISP FOR

## (undated) DEVICE — T4 HOOD

## (undated) DEVICE — 3M™ IOBAN™ 2 ANTIMICROBIAL INCISE DRAPE 6651EZ: Brand: IOBAN™ 2

## (undated) DEVICE — YANKAUER,OPEN TIP,W/O VENT,STERILE: Brand: MEDLINE INDUSTRIES, INC.

## (undated) DEVICE — Device

## (undated) DEVICE — PINNACLE INTRODUCER SHEATH: Brand: PINNACLE

## (undated) DEVICE — HANDPIECE SET WITH COAXIAL HIGH FLOW TIP AND SUCTION TUBE: Brand: INTERPULSE

## (undated) DEVICE — SUTURE VCRL SZ 0 L36IN ABSRB UD L40MM CT 1/2 CIR TAPERPOINT J958H

## (undated) DEVICE — SUTURE MCRYL SZ 3-0 L27IN ABSRB UD L19MM PS-2 3/8 CIR PRIM Y427H

## (undated) DEVICE — SPONGE GZ W4XL4IN COT 12 PLY TYP VII WVN C FLD DSGN

## (undated) DEVICE — STRAP,POSITIONING,KNEE/BODY,FOAM,4X60": Brand: MEDLINE

## (undated) DEVICE — COVER LT HNDL PLAS RIG 1 PER PK

## (undated) DEVICE — 1010 S-DRAPE TOWEL DRAPE 10/BX: Brand: STERI-DRAPE™

## (undated) DEVICE — STERILE POLYISOPRENE POWDER-FREE SURGICAL GLOVES: Brand: PROTEXIS

## (undated) DEVICE — STERILE POLYISOPRENE POWDER-FREE SURGICAL GLOVES WITH EMOLLIENT COATING: Brand: PROTEXIS

## (undated) DEVICE — Device: Brand: OMNIWIRE PRESSURE GUIDE WIRE

## (undated) DEVICE — STRYKER PERFORMANCE SERIES SAGITTAL BLADE: Brand: STRYKER PERFORMANCE SERIES

## (undated) DEVICE — REM POLYHESIVE ADULT PATIENT RETURN ELECTRODE: Brand: VALLEYLAB

## (undated) DEVICE — SOL IRR SOD CL 0.9% 1000ML BTL --

## (undated) DEVICE — GOWN,SIRUS,NONRNF,SETINSLV,2XL,18/CS: Brand: MEDLINE

## (undated) DEVICE — DERMABOND SKIN ADH 0.7ML -- DERMABOND ADVANCED 12/BX

## (undated) DEVICE — PAD BD MATTRESS 73X32 IN STD CONVOLUTED FOAM LTX FREE

## (undated) DEVICE — DRAPE,REIN 53X77,STERILE: Brand: MEDLINE

## (undated) DEVICE — CATH DIAG-D 6F MULTI PIG 155 5 -- IMPULSE 16599-302

## (undated) DEVICE — DRAPE,U/ SHT,SPLIT,PLAS,STERIL: Brand: MEDLINE

## (undated) DEVICE — SOL IRR SOD CL 0.9% 3000ML --

## (undated) DEVICE — SUTURE VCRL SZ 0 L18IN ABSRB VLT L40MM CT 1/2 CIR J752D

## (undated) DEVICE — HEWSON SUTURE RETRIEVER: Brand: HEWSON SUTURE RETRIEVER

## (undated) DEVICE — TOTAL TRAY, 16FR 10ML SIL FOLEY, URN: Brand: MEDLINE

## (undated) DEVICE — CANISTER, RIGID, 3000CC: Brand: MEDLINE INDUSTRIES, INC.

## (undated) DEVICE — INTENDED FOR TISSUE SEPARATION, AND OTHER PROCEDURES THAT REQUIRE A SHARP SURGICAL BLADE TO PUNCTURE OR CUT.: Brand: BARD-PARKER ® CARBON RIB-BACK BLADES

## (undated) DEVICE — TUBING PRSS MON L6IN PVC M FEM CONN

## (undated) DEVICE — ANGIOGRAPHIC CATHETER: Brand: EXPO™

## (undated) DEVICE — SUTURE VCRL SZ 2-0 L18IN ABSRB UD L26MM CP-2 1/2 CIR REV J762D

## (undated) DEVICE — DRAPE,HIP,W/POUCHES,STERILE: Brand: MEDLINE

## (undated) DEVICE — CATH GUID COR JL4.0 6FR 100CM -- LAUNCHER

## (undated) DEVICE — HEART CATH-MRMC: Brand: MEDLINE INDUSTRIES, INC.

## (undated) DEVICE — IMMOBILIZER KNEE 3 PNL 19 IN

## (undated) DEVICE — PATIENT PROTECTIVE PAD FOR IMP UNIVERSAL LATERAL HIP POSITIONER (ULP) (6/CASE): Brand: PATIENT PROTECTIVE PAD

## (undated) DEVICE — PROCEDURE KIT FLUID MGMT CUST MAINFOLD STRL

## (undated) DEVICE — NEEDLE ANGIO 18GA L9CM NRML 1 WALL SMOOTH FINISH CLR HUB FOR

## (undated) DEVICE — VALVE ANGIO ID0.11IN HEMSTAT MTL GUID WIRE INSRT TOOL AND

## (undated) DEVICE — INFECTION CONTROL KIT SYS

## (undated) DEVICE — PREP SKN CHLRAPRP APL 26ML STR --